# Patient Record
Sex: FEMALE | Race: WHITE | NOT HISPANIC OR LATINO | Employment: OTHER | URBAN - METROPOLITAN AREA
[De-identification: names, ages, dates, MRNs, and addresses within clinical notes are randomized per-mention and may not be internally consistent; named-entity substitution may affect disease eponyms.]

---

## 2023-05-04 ENCOUNTER — TELEPHONE (OUTPATIENT)
Dept: PRIMARY CARE | Facility: CLINIC | Age: 73
End: 2023-05-04
Payer: MEDICARE

## 2023-05-04 DIAGNOSIS — Z12.31 SCREENING MAMMOGRAM, ENCOUNTER FOR: Primary | ICD-10-CM

## 2023-05-08 LAB
ALANINE AMINOTRANSFERASE (SGPT) (U/L) IN SER/PLAS: 21 U/L (ref 7–45)
ALBUMIN (G/DL) IN SER/PLAS: 4.2 G/DL (ref 3.4–5)
ALKALINE PHOSPHATASE (U/L) IN SER/PLAS: 58 U/L (ref 33–136)
ANION GAP IN SER/PLAS: 10 MMOL/L (ref 10–20)
ASPARTATE AMINOTRANSFERASE (SGOT) (U/L) IN SER/PLAS: 28 U/L (ref 9–39)
BILIRUBIN TOTAL (MG/DL) IN SER/PLAS: 0.6 MG/DL (ref 0–1.2)
CALCIUM (MG/DL) IN SER/PLAS: 9.4 MG/DL (ref 8.6–10.3)
CARBON DIOXIDE, TOTAL (MMOL/L) IN SER/PLAS: 27 MMOL/L (ref 21–32)
CHLORIDE (MMOL/L) IN SER/PLAS: 104 MMOL/L (ref 98–107)
CHOLESTEROL (MG/DL) IN SER/PLAS: 199 MG/DL (ref 0–199)
CHOLESTEROL IN HDL (MG/DL) IN SER/PLAS: 71.4 MG/DL
CHOLESTEROL/HDL RATIO: 2.8
CREATININE (MG/DL) IN SER/PLAS: 0.87 MG/DL (ref 0.5–1.05)
GFR FEMALE: 70 ML/MIN/1.73M2
GLUCOSE (MG/DL) IN SER/PLAS: 83 MG/DL (ref 74–99)
LDL: 105 MG/DL (ref 0–99)
POTASSIUM (MMOL/L) IN SER/PLAS: 4.9 MMOL/L (ref 3.5–5.3)
PROTEIN TOTAL: 7.3 G/DL (ref 6.4–8.2)
SODIUM (MMOL/L) IN SER/PLAS: 136 MMOL/L (ref 136–145)
TRIGLYCERIDE (MG/DL) IN SER/PLAS: 113 MG/DL (ref 0–149)
UREA NITROGEN (MG/DL) IN SER/PLAS: 13 MG/DL (ref 6–23)
VLDL: 23 MG/DL (ref 0–40)

## 2023-05-12 PROBLEM — H35.00 RETINOPATHY: Status: ACTIVE | Noted: 2023-05-12

## 2023-05-12 PROBLEM — C44.91 BASAL CELL CARCINOMA: Status: ACTIVE | Noted: 2023-05-12

## 2023-05-12 PROBLEM — M19.90 ARTHRITIS: Status: ACTIVE | Noted: 2023-05-12

## 2023-05-15 ENCOUNTER — OFFICE VISIT (OUTPATIENT)
Dept: PRIMARY CARE | Facility: CLINIC | Age: 73
End: 2023-05-15
Payer: MEDICARE

## 2023-05-15 VITALS
OXYGEN SATURATION: 97 % | BODY MASS INDEX: 28 KG/M2 | HEART RATE: 68 BPM | DIASTOLIC BLOOD PRESSURE: 82 MMHG | HEIGHT: 63 IN | WEIGHT: 158 LBS | SYSTOLIC BLOOD PRESSURE: 130 MMHG | TEMPERATURE: 98.6 F

## 2023-05-15 DIAGNOSIS — K21.9 GASTROESOPHAGEAL REFLUX DISEASE WITHOUT ESOPHAGITIS: ICD-10-CM

## 2023-05-15 DIAGNOSIS — I10 ESSENTIAL HYPERTENSION: Primary | ICD-10-CM

## 2023-05-15 DIAGNOSIS — N18.2 CKD (CHRONIC KIDNEY DISEASE) STAGE 2, GFR 60-89 ML/MIN: ICD-10-CM

## 2023-05-15 DIAGNOSIS — E78.5 HYPERLIPIDEMIA, UNSPECIFIED HYPERLIPIDEMIA TYPE: ICD-10-CM

## 2023-05-15 PROCEDURE — 1036F TOBACCO NON-USER: CPT | Performed by: FAMILY MEDICINE

## 2023-05-15 PROCEDURE — 99214 OFFICE O/P EST MOD 30 MIN: CPT | Performed by: FAMILY MEDICINE

## 2023-05-15 PROCEDURE — 1160F RVW MEDS BY RX/DR IN RCRD: CPT | Performed by: FAMILY MEDICINE

## 2023-05-15 PROCEDURE — 1159F MED LIST DOCD IN RCRD: CPT | Performed by: FAMILY MEDICINE

## 2023-05-15 PROCEDURE — 3075F SYST BP GE 130 - 139MM HG: CPT | Performed by: FAMILY MEDICINE

## 2023-05-15 PROCEDURE — 3079F DIAST BP 80-89 MM HG: CPT | Performed by: FAMILY MEDICINE

## 2023-05-15 RX ORDER — AMOXICILLIN 500 MG
CAPSULE ORAL
COMMUNITY

## 2023-05-15 RX ORDER — SIMVASTATIN 20 MG/1
20 TABLET, FILM COATED ORAL DAILY
COMMUNITY
End: 2023-05-15 | Stop reason: DRUGHIGH

## 2023-05-15 RX ORDER — MAGNESIUM GLUCONATE 27.5 (500)
TABLET ORAL
COMMUNITY

## 2023-05-15 RX ORDER — OMEPRAZOLE 10 MG/1
10 CAPSULE, DELAYED RELEASE ORAL DAILY
COMMUNITY
End: 2023-06-27

## 2023-05-15 RX ORDER — LOSARTAN POTASSIUM 100 MG/1
100 TABLET ORAL DAILY
COMMUNITY
End: 2023-05-15 | Stop reason: SDUPTHER

## 2023-05-15 RX ORDER — ACETAMINOPHEN 500 MG
2 TABLET ORAL DAILY
COMMUNITY

## 2023-05-15 RX ORDER — SIMVASTATIN 40 MG/1
40 TABLET, FILM COATED ORAL NIGHTLY
Qty: 90 TABLET | Refills: 0 | Status: SHIPPED | OUTPATIENT
Start: 2023-05-15 | End: 2023-08-24 | Stop reason: SDUPTHER

## 2023-05-15 RX ORDER — NAPROXEN SODIUM 220 MG/1
81 TABLET, FILM COATED ORAL DAILY
COMMUNITY

## 2023-05-15 RX ORDER — LOSARTAN POTASSIUM 100 MG/1
100 TABLET ORAL DAILY
Qty: 90 TABLET | Refills: 0 | Status: SHIPPED | OUTPATIENT
Start: 2023-05-15 | End: 2023-08-24 | Stop reason: SDUPTHER

## 2023-05-15 RX ORDER — B-COMPLEX WITH VITAMIN C
1 TABLET ORAL DAILY
COMMUNITY

## 2023-05-15 ASSESSMENT — ENCOUNTER SYMPTOMS
SHORTNESS OF BREATH: 0
DIFFICULTY URINATING: 0
HYPERTENSION: 1
POLYDIPSIA: 0
SLEEP DISTURBANCE: 0
ABDOMINAL DISTENTION: 0
PALPITATIONS: 0
VOMITING: 0
FATIGUE: 0
BLOOD IN STOOL: 0
DIZZINESS: 0
DIARRHEA: 0
DYSPHORIC MOOD: 0
ARTHRALGIAS: 0
POLYPHAGIA: 0
ABDOMINAL PAIN: 0
CONSTIPATION: 0
HEADACHES: 0
NAUSEA: 0
DYSURIA: 0
MYALGIAS: 0

## 2023-05-15 NOTE — PROGRESS NOTES
"Subjective   Patient ID: Bianka Huffman is a 72 y.o. female who presents for Hypertension (Recheck), Hyperlipidemia (Review BW), Chronic Kidney Disease, and Breast Cancer Screening (Results done last week) and multiple issues    Hypertension  Pertinent negatives include no chest pain, headaches, palpitations or shortness of breath.   Hyperlipidemia  Pertinent negatives include no chest pain, myalgias or shortness of breath.      HTN: controlled  GERD: stable on PPI  Lipids: fair control. HDL 71, (88),   CKD: stable. Cr nml, GFR 70    Review of Systems   Constitutional:  Negative for fatigue.   HENT: Negative.     Eyes:  Negative for visual disturbance.   Respiratory:  Negative for shortness of breath.    Cardiovascular:  Negative for chest pain and palpitations.   Gastrointestinal:  Negative for abdominal distention, abdominal pain, blood in stool, constipation, diarrhea, nausea and vomiting.   Endocrine: Negative for cold intolerance, heat intolerance, polydipsia, polyphagia and polyuria.   Genitourinary:  Negative for difficulty urinating and dysuria.   Musculoskeletal:  Negative for arthralgias and myalgias.   Skin:  Negative for rash.   Neurological:  Negative for dizziness and headaches.   Psychiatric/Behavioral:  Negative for dysphoric mood and sleep disturbance.        Objective   /82   Pulse 68   Temp 37 °C (98.6 °F) (Temporal)   Ht 1.607 m (5' 3.25\")   Wt 71.7 kg (158 lb)   SpO2 97%   BMI 27.77 kg/m²     Physical Exam  Vitals and nursing note reviewed.   Constitutional:       General: She is not in acute distress.     Appearance: Normal appearance. She is not toxic-appearing.   HENT:      Head: Normocephalic.   Eyes:      General: No scleral icterus.     Pupils: Pupils are equal, round, and reactive to light.   Neck:      Vascular: No carotid bruit.   Cardiovascular:      Rate and Rhythm: Normal rate and regular rhythm.      Pulses: Normal pulses.      Heart sounds: No murmur " heard.  Pulmonary:      Effort: Pulmonary effort is normal. No respiratory distress.      Breath sounds: Normal breath sounds.   Abdominal:      General: Bowel sounds are normal.      Palpations: Abdomen is soft.      Tenderness: There is no abdominal tenderness. There is no guarding.   Musculoskeletal:         General: No tenderness.   Lymphadenopathy:      Cervical: No cervical adenopathy.   Skin:     General: Skin is warm.   Neurological:      General: No focal deficit present.      Mental Status: She is alert.      Cranial Nerves: No cranial nerve deficit.   Psychiatric:         Mood and Affect: Mood normal.         Assessment/Plan   Problem List Items Addressed This Visit          Circulatory    Essential hypertension - Primary    Relevant Medications    losartan (Cozaar) 100 mg tablet    Other Relevant Orders    Comprehensive Metabolic Panel       Digestive    Gastroesophageal reflux disease       Other    Hyperlipidemia    Relevant Medications    simvastatin (Zocor) 40 mg tablet    Other Relevant Orders    Comprehensive Metabolic Panel    Lipid Panel     Other Visit Diagnoses       CKD (chronic kidney disease) stage 2, GFR 60-89 ml/min        Relevant Orders    Comprehensive Metabolic Panel        Discussed blood work

## 2023-05-15 NOTE — PATIENT INSTRUCTIONS
Recommend a predominant whole foods plant based diet.  Cut back on meat, dairy, salt and oils. Increase fiber in your diet.  Decrease alcohol as much as possible if you drink. Recommend regular exercise most days of the week.    Increase your cholesterol med to  40 mg    Continue your other medications    Follow up in 3 months for recheck and medicare visit, sooner if needed

## 2023-05-16 ENCOUNTER — TELEPHONE (OUTPATIENT)
Dept: PRIMARY CARE | Facility: CLINIC | Age: 73
End: 2023-05-16
Payer: MEDICARE

## 2023-05-16 NOTE — TELEPHONE ENCOUNTER
Pt called and stated that during her appointment on 5/16 she mentioned to Iel that she swallowed something and IEL informed her to call poison control. Pt called them and they stated that she just needs to watch her stool and only to be concerned if it is bloody. Otherwise she probably passed it. Poison control stated it was probably aluminum.

## 2023-06-22 ENCOUNTER — APPOINTMENT (OUTPATIENT)
Dept: PRIMARY CARE | Facility: CLINIC | Age: 73
End: 2023-06-22
Payer: MEDICARE

## 2023-06-22 DIAGNOSIS — K21.9 GASTROESOPHAGEAL REFLUX DISEASE WITHOUT ESOPHAGITIS: Primary | ICD-10-CM

## 2023-06-27 RX ORDER — OMEPRAZOLE 10 MG/1
CAPSULE, DELAYED RELEASE ORAL
Qty: 90 CAPSULE | Refills: 0 | Status: SHIPPED | OUTPATIENT
Start: 2023-06-27 | End: 2023-08-24 | Stop reason: SDUPTHER

## 2023-08-17 ENCOUNTER — LAB (OUTPATIENT)
Dept: LAB | Facility: LAB | Age: 73
End: 2023-08-17
Payer: MEDICARE

## 2023-08-17 DIAGNOSIS — I10 ESSENTIAL HYPERTENSION: ICD-10-CM

## 2023-08-17 DIAGNOSIS — N18.2 CKD (CHRONIC KIDNEY DISEASE) STAGE 2, GFR 60-89 ML/MIN: ICD-10-CM

## 2023-08-17 DIAGNOSIS — E78.5 HYPERLIPIDEMIA, UNSPECIFIED HYPERLIPIDEMIA TYPE: ICD-10-CM

## 2023-08-17 LAB
ALANINE AMINOTRANSFERASE (SGPT) (U/L) IN SER/PLAS: 18 U/L (ref 7–45)
ALBUMIN (G/DL) IN SER/PLAS: 4.1 G/DL (ref 3.4–5)
ALKALINE PHOSPHATASE (U/L) IN SER/PLAS: 55 U/L (ref 33–136)
ANION GAP IN SER/PLAS: 10 MMOL/L (ref 10–20)
ASPARTATE AMINOTRANSFERASE (SGOT) (U/L) IN SER/PLAS: 18 U/L (ref 9–39)
BILIRUBIN TOTAL (MG/DL) IN SER/PLAS: 0.6 MG/DL (ref 0–1.2)
CALCIUM (MG/DL) IN SER/PLAS: 10.1 MG/DL (ref 8.6–10.3)
CARBON DIOXIDE, TOTAL (MMOL/L) IN SER/PLAS: 29 MMOL/L (ref 21–32)
CHLORIDE (MMOL/L) IN SER/PLAS: 103 MMOL/L (ref 98–107)
CHOLESTEROL (MG/DL) IN SER/PLAS: 154 MG/DL (ref 0–199)
CHOLESTEROL IN HDL (MG/DL) IN SER/PLAS: 67.2 MG/DL
CHOLESTEROL/HDL RATIO: 2.3
CREATININE (MG/DL) IN SER/PLAS: 0.73 MG/DL (ref 0.5–1.05)
GFR FEMALE: 86 ML/MIN/1.73M2
GLUCOSE (MG/DL) IN SER/PLAS: 82 MG/DL (ref 74–99)
LDL: 63 MG/DL (ref 0–99)
POTASSIUM (MMOL/L) IN SER/PLAS: 4.6 MMOL/L (ref 3.5–5.3)
PROTEIN TOTAL: 6.3 G/DL (ref 6.4–8.2)
SODIUM (MMOL/L) IN SER/PLAS: 137 MMOL/L (ref 136–145)
TRIGLYCERIDE (MG/DL) IN SER/PLAS: 120 MG/DL (ref 0–149)
UREA NITROGEN (MG/DL) IN SER/PLAS: 15 MG/DL (ref 6–23)
VLDL: 24 MG/DL (ref 0–40)

## 2023-08-17 PROCEDURE — 80061 LIPID PANEL: CPT

## 2023-08-17 PROCEDURE — 36415 COLL VENOUS BLD VENIPUNCTURE: CPT

## 2023-08-17 PROCEDURE — 80053 COMPREHEN METABOLIC PANEL: CPT

## 2023-08-24 ENCOUNTER — OFFICE VISIT (OUTPATIENT)
Dept: PRIMARY CARE | Facility: CLINIC | Age: 73
End: 2023-08-24
Payer: MEDICARE

## 2023-08-24 VITALS
TEMPERATURE: 97.7 F | OXYGEN SATURATION: 97 % | HEART RATE: 85 BPM | BODY MASS INDEX: 28.12 KG/M2 | SYSTOLIC BLOOD PRESSURE: 116 MMHG | WEIGHT: 160 LBS | DIASTOLIC BLOOD PRESSURE: 78 MMHG

## 2023-08-24 DIAGNOSIS — J32.9 SINUSITIS, UNSPECIFIED CHRONICITY, UNSPECIFIED LOCATION: ICD-10-CM

## 2023-08-24 DIAGNOSIS — Z23 NEED FOR INFLUENZA VACCINATION: ICD-10-CM

## 2023-08-24 DIAGNOSIS — J02.9 SORE THROAT: Primary | ICD-10-CM

## 2023-08-24 DIAGNOSIS — M25.551 PAIN OF RIGHT HIP: ICD-10-CM

## 2023-08-24 DIAGNOSIS — E78.5 HYPERLIPIDEMIA, UNSPECIFIED HYPERLIPIDEMIA TYPE: ICD-10-CM

## 2023-08-24 DIAGNOSIS — N18.2 CKD (CHRONIC KIDNEY DISEASE) STAGE 2, GFR 60-89 ML/MIN: ICD-10-CM

## 2023-08-24 DIAGNOSIS — K21.9 GASTROESOPHAGEAL REFLUX DISEASE WITHOUT ESOPHAGITIS: ICD-10-CM

## 2023-08-24 DIAGNOSIS — I10 ESSENTIAL HYPERTENSION: ICD-10-CM

## 2023-08-24 LAB — POC RAPID STREP: NEGATIVE

## 2023-08-24 PROCEDURE — 1036F TOBACCO NON-USER: CPT | Performed by: FAMILY MEDICINE

## 2023-08-24 PROCEDURE — 87635 SARS-COV-2 COVID-19 AMP PRB: CPT

## 2023-08-24 PROCEDURE — 1159F MED LIST DOCD IN RCRD: CPT | Performed by: FAMILY MEDICINE

## 2023-08-24 PROCEDURE — 90662 IIV NO PRSV INCREASED AG IM: CPT | Performed by: FAMILY MEDICINE

## 2023-08-24 PROCEDURE — 99214 OFFICE O/P EST MOD 30 MIN: CPT | Performed by: FAMILY MEDICINE

## 2023-08-24 PROCEDURE — G0008 ADMIN INFLUENZA VIRUS VAC: HCPCS | Performed by: FAMILY MEDICINE

## 2023-08-24 PROCEDURE — 1160F RVW MEDS BY RX/DR IN RCRD: CPT | Performed by: FAMILY MEDICINE

## 2023-08-24 PROCEDURE — 3078F DIAST BP <80 MM HG: CPT | Performed by: FAMILY MEDICINE

## 2023-08-24 PROCEDURE — 3074F SYST BP LT 130 MM HG: CPT | Performed by: FAMILY MEDICINE

## 2023-08-24 PROCEDURE — 87880 STREP A ASSAY W/OPTIC: CPT | Performed by: FAMILY MEDICINE

## 2023-08-24 RX ORDER — LOSARTAN POTASSIUM 100 MG/1
100 TABLET ORAL DAILY
Qty: 90 TABLET | Refills: 0 | Status: CANCELLED | OUTPATIENT
Start: 2023-08-24 | End: 2023-11-22

## 2023-08-24 RX ORDER — OMEPRAZOLE 10 MG/1
10 CAPSULE, DELAYED RELEASE ORAL DAILY
Qty: 90 CAPSULE | Refills: 0 | Status: CANCELLED | OUTPATIENT
Start: 2023-08-24

## 2023-08-24 RX ORDER — AMOXICILLIN AND CLAVULANATE POTASSIUM 875; 125 MG/1; MG/1
875 TABLET, FILM COATED ORAL 2 TIMES DAILY
Qty: 20 TABLET | Refills: 0 | Status: SHIPPED | OUTPATIENT
Start: 2023-08-24 | End: 2023-09-03

## 2023-08-24 RX ORDER — SIMVASTATIN 40 MG/1
40 TABLET, FILM COATED ORAL NIGHTLY
Qty: 90 TABLET | Refills: 0 | Status: CANCELLED | OUTPATIENT
Start: 2023-08-24 | End: 2023-11-22

## 2023-08-24 RX ORDER — LOSARTAN POTASSIUM 100 MG/1
100 TABLET ORAL DAILY
Qty: 180 TABLET | Refills: 0 | Status: SHIPPED | OUTPATIENT
Start: 2023-08-24 | End: 2024-05-15 | Stop reason: SDUPTHER

## 2023-08-24 RX ORDER — SIMVASTATIN 40 MG/1
40 TABLET, FILM COATED ORAL NIGHTLY
Qty: 180 TABLET | Refills: 0 | Status: SHIPPED | OUTPATIENT
Start: 2023-08-24 | End: 2023-08-29

## 2023-08-24 RX ORDER — OMEPRAZOLE 10 MG/1
10 CAPSULE, DELAYED RELEASE ORAL DAILY
Qty: 180 CAPSULE | Refills: 0 | Status: SHIPPED | OUTPATIENT
Start: 2023-08-24 | End: 2023-12-11 | Stop reason: SDUPTHER

## 2023-08-24 RX ORDER — GLUCOSAM/CHONDRO/HERB 149/HYAL 750-100 MG
1000 TABLET ORAL DAILY
COMMUNITY

## 2023-08-24 ASSESSMENT — ENCOUNTER SYMPTOMS
VOMITING: 0
BLOOD IN STOOL: 0
HYPERTENSION: 1
DIFFICULTY URINATING: 0
CONSTIPATION: 0
MYALGIAS: 0
ARTHRALGIAS: 0
POLYDIPSIA: 0
SLEEP DISTURBANCE: 0
PALPITATIONS: 0
SORE THROAT: 1
DYSURIA: 0
SHORTNESS OF BREATH: 0
NAUSEA: 0
FATIGUE: 1
DIZZINESS: 0
ABDOMINAL PAIN: 0
SINUS COMPLAINT: 1
DYSPHORIC MOOD: 0
HEADACHES: 0
DIARRHEA: 0
POLYPHAGIA: 0

## 2023-08-24 NOTE — PATIENT INSTRUCTIONS
Recommend a predominant whole foods plant based diet.  Cut back on meat, dairy, salt and oils. Increase fiber in your diet.  Decrease alcohol as much as possible if you drink. Recommend regular exercise most days of the week.    Start antibiotic.     Continue your current meds but ok to decrease cholesterol med.    Follow up in 3 months for recheck and medicare visit, sooner if needed

## 2023-08-24 NOTE — PROGRESS NOTES
Subjective   Patient ID: Bianka Huffman is a 72 y.o. female who presents for GERD, Hypertension, Hyperlipidemia (Review BW), Sore Throat, and Sinus Problem (Drainage x 1 week).    GERD  She complains of a sore throat. She reports no abdominal pain, no chest pain or no nausea. Associated symptoms include fatigue.   Hypertension  Pertinent negatives include no chest pain, headaches, palpitations or shortness of breath.   Hyperlipidemia  Pertinent negatives include no chest pain, myalgias or shortness of breath.   Sore Throat   Pertinent negatives include no abdominal pain, diarrhea, headaches, shortness of breath or vomiting.   Sinus Problem  Associated symptoms include a sore throat. Pertinent negatives include no headaches or shortness of breath.      Sinus: onset x 1 wk. + drainage. Started w/ laryngitis 2 wks ago. Felt better but now +drainage and sore throat. +fatigue.     CKD: stable. Cr nml, GFR 86    Lipids: HDL 67, LDL sig improvement 63(105), . Want to dec statin since also changed diet.     HTN: controlled.     GERD: controlled.     Rt hip pain: onset for several wk. No injury. Wakes up with throbbing pain rt inguinal area. No radiation of pain. No LBP    Review of Systems   Constitutional:  Positive for fatigue.   HENT:  Positive for postnasal drip and sore throat.    Eyes:  Negative for visual disturbance.   Respiratory:  Negative for shortness of breath.    Cardiovascular:  Negative for chest pain and palpitations.   Gastrointestinal:  Negative for abdominal pain, blood in stool, constipation, diarrhea, nausea and vomiting.   Endocrine: Negative for cold intolerance, heat intolerance, polydipsia, polyphagia and polyuria.   Genitourinary:  Negative for difficulty urinating and dysuria.   Musculoskeletal:  Negative for arthralgias and myalgias.   Skin:  Negative for rash.   Neurological:  Negative for dizziness and headaches.   Psychiatric/Behavioral:  Negative for dysphoric mood and sleep  disturbance.        Objective   /78   Pulse 85   Temp 36.5 °C (97.7 °F)   Wt 72.6 kg (160 lb)   SpO2 97%   BMI 28.12 kg/m²     Physical Exam  Constitutional:       General: She is not in acute distress.     Appearance: Normal appearance. She is not toxic-appearing.   HENT:      Head: Normocephalic.      Right Ear: Tympanic membrane normal.      Left Ear: Tympanic membrane normal.      Nose: Nose normal.      Mouth/Throat:      Pharynx: Oropharynx is clear.   Eyes:      General: No scleral icterus.     Pupils: Pupils are equal, round, and reactive to light.   Neck:      Vascular: No carotid bruit.   Cardiovascular:      Rate and Rhythm: Normal rate and regular rhythm.      Pulses: Normal pulses.      Heart sounds: No murmur heard.  Pulmonary:      Effort: Pulmonary effort is normal. No respiratory distress.      Breath sounds: Normal breath sounds.   Abdominal:      General: Bowel sounds are normal.      Palpations: Abdomen is soft.      Tenderness: There is no abdominal tenderness. There is no guarding.   Musculoskeletal:      Right lower leg: No edema.      Left lower leg: No edema.      Comments: Neg IRENE/FADIR. L spine NTTP. Gait nml   Skin:     General: Skin is warm.   Neurological:      General: No focal deficit present.      Mental Status: She is alert.      Cranial Nerves: No cranial nerve deficit.   Psychiatric:         Mood and Affect: Mood normal.         Assessment/Plan   Problem List Items Addressed This Visit       Essential hypertension    Relevant Medications    losartan (Cozaar) 100 mg tablet    Other Relevant Orders    Comprehensive Metabolic Panel    Gastroesophageal reflux disease    Relevant Medications    omeprazole (PriLOSEC) 10 mg DR capsule    Hyperlipidemia    Relevant Medications    simvastatin (Zocor) 40 mg tablet    Other Relevant Orders    Comprehensive Metabolic Panel    Lipid Panel     Other Visit Diagnoses       Sore throat    -  Primary    Relevant Orders    POCT Rapid  Strep A manually resulted (Completed)    Sinusitis, unspecified chronicity, unspecified location        Relevant Medications    amoxicillin-pot clavulanate (Augmentin) 875-125 mg tablet    Other Relevant Orders    Sars-CoV-2 PCR, Symptomatic    Pain of right hip        CKD (chronic kidney disease) stage 2, GFR 60-89 ml/min        Relevant Orders    Comprehensive Metabolic Panel    Need for influenza vaccination        Relevant Orders    Flu vaccine, quadrivalent, high-dose, preservative free, age 65y+ (Completed)        Discussed blood work. Referred for xray. Discussed side effect of med

## 2023-08-25 ENCOUNTER — TELEPHONE (OUTPATIENT)
Dept: PRIMARY CARE | Facility: CLINIC | Age: 73
End: 2023-08-25
Payer: MEDICARE

## 2023-08-25 LAB — SARS-COV-2 RESULT: NOT DETECTED

## 2023-08-28 ENCOUNTER — TELEPHONE (OUTPATIENT)
Dept: PRIMARY CARE | Facility: CLINIC | Age: 73
End: 2023-08-28
Payer: MEDICARE

## 2023-08-28 NOTE — TELEPHONE ENCOUNTER
Pt called rx line @ 2:22pm stating that her Simvastatin was lowered from 40mg to 20mg, but the 40mg was still sent in. Pt has FU to see how lower dose is, but lower dose not sent. Pt requesting 90 day for 20mg. VALARIE Chau    Next OV 11/27/23  Please advise./RUFUS

## 2023-08-29 DIAGNOSIS — E78.5 HYPERLIPIDEMIA, UNSPECIFIED HYPERLIPIDEMIA TYPE: Primary | ICD-10-CM

## 2023-08-29 RX ORDER — SIMVASTATIN 20 MG/1
20 TABLET, FILM COATED ORAL NIGHTLY
Qty: 90 TABLET | Refills: 0 | Status: SHIPPED | OUTPATIENT
Start: 2023-08-29 | End: 2024-05-15 | Stop reason: SDUPTHER

## 2023-11-24 DIAGNOSIS — E78.5 HYPERLIPIDEMIA, UNSPECIFIED HYPERLIPIDEMIA TYPE: ICD-10-CM

## 2023-11-27 ENCOUNTER — APPOINTMENT (OUTPATIENT)
Dept: PRIMARY CARE | Facility: CLINIC | Age: 73
End: 2023-11-27
Payer: MEDICARE

## 2023-11-27 RX ORDER — SIMVASTATIN 20 MG/1
20 TABLET, FILM COATED ORAL NIGHTLY
Qty: 90 TABLET | Refills: 0 | OUTPATIENT
Start: 2023-11-27

## 2023-12-05 ENCOUNTER — LAB (OUTPATIENT)
Dept: LAB | Facility: LAB | Age: 73
End: 2023-12-05
Payer: MEDICARE

## 2023-12-05 DIAGNOSIS — I10 ESSENTIAL HYPERTENSION: ICD-10-CM

## 2023-12-05 DIAGNOSIS — N18.2 CKD (CHRONIC KIDNEY DISEASE) STAGE 2, GFR 60-89 ML/MIN: ICD-10-CM

## 2023-12-05 DIAGNOSIS — E78.5 HYPERLIPIDEMIA, UNSPECIFIED HYPERLIPIDEMIA TYPE: ICD-10-CM

## 2023-12-05 LAB
ALBUMIN SERPL BCP-MCNC: 4.3 G/DL (ref 3.4–5)
ALP SERPL-CCNC: 61 U/L (ref 33–136)
ALT SERPL W P-5'-P-CCNC: 22 U/L (ref 7–45)
ANION GAP SERPL CALC-SCNC: 11 MMOL/L (ref 10–20)
AST SERPL W P-5'-P-CCNC: 19 U/L (ref 9–39)
BILIRUB SERPL-MCNC: 0.5 MG/DL (ref 0–1.2)
BUN SERPL-MCNC: 17 MG/DL (ref 6–23)
CALCIUM SERPL-MCNC: 10.5 MG/DL (ref 8.6–10.3)
CHLORIDE SERPL-SCNC: 101 MMOL/L (ref 98–107)
CHOLEST SERPL-MCNC: 197 MG/DL (ref 0–199)
CHOLESTEROL/HDL RATIO: 2.7
CO2 SERPL-SCNC: 28 MMOL/L (ref 21–32)
CREAT SERPL-MCNC: 0.89 MG/DL (ref 0.5–1.05)
GFR SERPL CREATININE-BSD FRML MDRD: 68 ML/MIN/1.73M*2
GLUCOSE SERPL-MCNC: 86 MG/DL (ref 74–99)
HDLC SERPL-MCNC: 73.1 MG/DL
LDLC SERPL CALC-MCNC: 103 MG/DL
NON HDL CHOLESTEROL: 124 MG/DL (ref 0–149)
POTASSIUM SERPL-SCNC: 5.1 MMOL/L (ref 3.5–5.3)
PROT SERPL-MCNC: 6.6 G/DL (ref 6.4–8.2)
SODIUM SERPL-SCNC: 135 MMOL/L (ref 136–145)
TRIGL SERPL-MCNC: 103 MG/DL (ref 0–149)
VLDL: 21 MG/DL (ref 0–40)

## 2023-12-05 PROCEDURE — 36415 COLL VENOUS BLD VENIPUNCTURE: CPT

## 2023-12-05 PROCEDURE — 80061 LIPID PANEL: CPT

## 2023-12-05 PROCEDURE — 80053 COMPREHEN METABOLIC PANEL: CPT

## 2023-12-11 ENCOUNTER — OFFICE VISIT (OUTPATIENT)
Dept: PRIMARY CARE | Facility: CLINIC | Age: 73
End: 2023-12-11
Payer: MEDICARE

## 2023-12-11 VITALS
OXYGEN SATURATION: 97 % | HEIGHT: 64 IN | SYSTOLIC BLOOD PRESSURE: 128 MMHG | WEIGHT: 163 LBS | TEMPERATURE: 97.1 F | DIASTOLIC BLOOD PRESSURE: 74 MMHG | BODY MASS INDEX: 27.83 KG/M2 | HEART RATE: 76 BPM

## 2023-12-11 DIAGNOSIS — I10 ESSENTIAL HYPERTENSION: ICD-10-CM

## 2023-12-11 DIAGNOSIS — E66.3 OVERWEIGHT (BMI 25.0-29.9): ICD-10-CM

## 2023-12-11 DIAGNOSIS — K21.9 GASTROESOPHAGEAL REFLUX DISEASE WITHOUT ESOPHAGITIS: ICD-10-CM

## 2023-12-11 DIAGNOSIS — E78.5 HYPERLIPIDEMIA, UNSPECIFIED HYPERLIPIDEMIA TYPE: ICD-10-CM

## 2023-12-11 DIAGNOSIS — Z00.00 ROUTINE GENERAL MEDICAL EXAMINATION AT HEALTH CARE FACILITY: Primary | ICD-10-CM

## 2023-12-11 PROCEDURE — 3074F SYST BP LT 130 MM HG: CPT | Performed by: FAMILY MEDICINE

## 2023-12-11 PROCEDURE — 1160F RVW MEDS BY RX/DR IN RCRD: CPT | Performed by: FAMILY MEDICINE

## 2023-12-11 PROCEDURE — G0446 INTENS BEHAVE THER CARDIO DX: HCPCS | Performed by: FAMILY MEDICINE

## 2023-12-11 PROCEDURE — 99214 OFFICE O/P EST MOD 30 MIN: CPT | Performed by: FAMILY MEDICINE

## 2023-12-11 PROCEDURE — 1170F FXNL STATUS ASSESSED: CPT | Performed by: FAMILY MEDICINE

## 2023-12-11 PROCEDURE — G0444 DEPRESSION SCREEN ANNUAL: HCPCS | Performed by: FAMILY MEDICINE

## 2023-12-11 PROCEDURE — 3078F DIAST BP <80 MM HG: CPT | Performed by: FAMILY MEDICINE

## 2023-12-11 PROCEDURE — 1159F MED LIST DOCD IN RCRD: CPT | Performed by: FAMILY MEDICINE

## 2023-12-11 PROCEDURE — 1036F TOBACCO NON-USER: CPT | Performed by: FAMILY MEDICINE

## 2023-12-11 PROCEDURE — G0439 PPPS, SUBSEQ VISIT: HCPCS | Performed by: FAMILY MEDICINE

## 2023-12-11 RX ORDER — OMEPRAZOLE 10 MG/1
10 CAPSULE, DELAYED RELEASE ORAL DAILY
Qty: 90 CAPSULE | Refills: 1 | Status: SHIPPED | OUTPATIENT
Start: 2023-12-11 | End: 2024-05-15 | Stop reason: SDUPTHER

## 2023-12-11 RX ORDER — SIMVASTATIN 20 MG/1
20 TABLET, FILM COATED ORAL NIGHTLY
Qty: 90 TABLET | Refills: 0 | Status: CANCELLED | OUTPATIENT
Start: 2023-12-11 | End: 2024-03-10

## 2023-12-11 ASSESSMENT — ACTIVITIES OF DAILY LIVING (ADL)
DOING_HOUSEWORK: INDEPENDENT
DRESSING: INDEPENDENT
MANAGING_FINANCES: INDEPENDENT
GROCERY_SHOPPING: INDEPENDENT
TAKING_MEDICATION: INDEPENDENT
BATHING: INDEPENDENT

## 2023-12-11 ASSESSMENT — ENCOUNTER SYMPTOMS
SHORTNESS OF BREATH: 0
HEADACHES: 0
PALPITATIONS: 0
DIZZINESS: 0
ABDOMINAL DISTENTION: 0
HYPERTENSION: 1
DYSPHORIC MOOD: 0
VOMITING: 0
ABDOMINAL PAIN: 0
DIARRHEA: 0
NAUSEA: 0
MYALGIAS: 0
DIFFICULTY URINATING: 0
POLYDIPSIA: 0
SLEEP DISTURBANCE: 0
DYSURIA: 0
POLYPHAGIA: 0
FATIGUE: 0
BLOOD IN STOOL: 0
CONSTIPATION: 0

## 2023-12-11 ASSESSMENT — PATIENT HEALTH QUESTIONNAIRE - PHQ9
SUM OF ALL RESPONSES TO PHQ9 QUESTIONS 1 AND 2: 0
1. LITTLE INTEREST OR PLEASURE IN DOING THINGS: NOT AT ALL
2. FEELING DOWN, DEPRESSED OR HOPELESS: NOT AT ALL

## 2023-12-11 NOTE — PROGRESS NOTES
"Subjective   Reason for Visit: Bianka Huffman is an 73 y.o. female here for a Medicare Wellness visit and multiple issues.     Past Medical, Surgical, and Family History reviewed and updated in chart.    Reviewed all medications by prescribing practitioner or clinical pharmacist (such as prescriptions, OTCs, herbal therapies and supplements) and documented in the medical record.    Hypertension  Pertinent negatives include no chest pain, headaches, palpitations or shortness of breath.   Hyperlipidemia  Pertinent negatives include no chest pain, myalgias or shortness of breath.       Patient Care Team:  Saba Daniels DO as PCP - General  Saba Daniels DO as PCP - [facility] Attributed Provider   Dr. Baker-dermatology    HTN: controlled.  Lipids: worsening on lower dose of statin. HDL 73, ,   GERD: controlled on low dose PPI  BMI: min high. Active. Trying to work on diet.     Pain: occ leg pain, hips. Xray showed mild OA rt hip.    Review of Systems   Constitutional:  Negative for fatigue.   HENT: Negative.     Eyes:  Negative for visual disturbance.   Respiratory:  Negative for shortness of breath.    Cardiovascular:  Negative for chest pain and palpitations.   Gastrointestinal:  Negative for abdominal distention, abdominal pain, blood in stool, constipation, diarrhea, nausea and vomiting.   Endocrine: Negative for cold intolerance, heat intolerance, polydipsia, polyphagia and polyuria.   Genitourinary:  Negative for difficulty urinating and dysuria.   Musculoskeletal:  Negative for myalgias.   Skin:  Negative for rash.   Neurological:  Negative for dizziness and headaches.   Psychiatric/Behavioral:  Negative for dysphoric mood and sleep disturbance.        Objective   Vitals:  /74   Pulse 76   Temp 36.2 °C (97.1 °F)   Ht 1.613 m (5' 3.5\")   Wt 73.9 kg (163 lb)   SpO2 97%   BMI 28.42 kg/m²       Physical Exam  Vitals and nursing note reviewed.   Constitutional:       General: She is " not in acute distress.     Appearance: Normal appearance. She is not toxic-appearing.   HENT:      Head: Normocephalic.   Eyes:      Pupils: Pupils are equal, round, and reactive to light.   Neck:      Vascular: No carotid bruit.   Cardiovascular:      Rate and Rhythm: Normal rate and regular rhythm.      Heart sounds: No murmur heard.  Pulmonary:      Effort: Pulmonary effort is normal. No respiratory distress.      Breath sounds: Normal breath sounds.   Abdominal:      General: Bowel sounds are normal.      Palpations: Abdomen is soft.      Tenderness: There is no abdominal tenderness. There is no guarding.   Musculoskeletal:      Cervical back: Neck supple.      Right lower leg: No edema.      Left lower leg: No edema.   Lymphadenopathy:      Cervical: No cervical adenopathy.   Skin:     General: Skin is warm.   Neurological:      General: No focal deficit present.      Mental Status: She is alert.      Cranial Nerves: No cranial nerve deficit.   Psychiatric:         Mood and Affect: Mood normal.         Assessment/Plan   Problem List Items Addressed This Visit       Essential hypertension    Overview     Note: ak         Gastroesophageal reflux disease    Relevant Medications    omeprazole (PriLOSEC) 10 mg DR capsule    Hyperlipidemia    Overview     Note: ak          Other Visit Diagnoses       Routine general medical examination at health care facility    -  Primary    Overweight (BMI 25.0-29.9)            Discussed blood work and wellness issues. Reviewed screenings and immunizations. Recommendations given    ASCVD risk counseling:  discussed ASCVD risk and score 18.9%.  Aspirin not indicated at this time. Lifestyle modifications including nutritional choices, exercise and trying to eliminate habits contributing to risk were discussed. Patient agreeable to make efforts to continue to control their current cardiovascular risk factors. On statin. Referred for CAC score

## 2023-12-11 NOTE — PATIENT INSTRUCTIONS
Recommend a predominant low fat whole foods plant based diet.  Cut back on meat, dairy, processed carbs, salt and oils. Increase fiber in your diet.  Decrease alcohol as much as possible if you drink. Recommend regular exercise most days of the week.    Increase cholesterol back to 40mg    Continue your current meds    Follow up with your specialists as scheduled    You were referred for calcium score test.     Return in 6 months, sooner if needed

## 2024-04-30 ENCOUNTER — TELEPHONE (OUTPATIENT)
Dept: PRIMARY CARE | Facility: CLINIC | Age: 74
End: 2024-04-30
Payer: MEDICARE

## 2024-04-30 DIAGNOSIS — E78.5 HYPERLIPIDEMIA, UNSPECIFIED HYPERLIPIDEMIA TYPE: ICD-10-CM

## 2024-04-30 DIAGNOSIS — Z12.31 ENCOUNTER FOR SCREENING MAMMOGRAM FOR MALIGNANT NEOPLASM OF BREAST: ICD-10-CM

## 2024-04-30 DIAGNOSIS — Z13.6 SCREENING FOR HEART DISEASE: Primary | ICD-10-CM

## 2024-04-30 NOTE — TELEPHONE ENCOUNTER
Pt called requesting lab order to be done prior to OV 5/15, she also is requesting a mammo and CA score order. I stated that can be discussed at next OV, she stated it was discussed at last OV, but no orders were put in. Please advise on ALL orders Thx      Next OV 5/15

## 2024-05-13 ENCOUNTER — HOSPITAL ENCOUNTER (OUTPATIENT)
Dept: RADIOLOGY | Facility: CLINIC | Age: 74
Discharge: HOME | End: 2024-05-13
Payer: MEDICARE

## 2024-05-13 ENCOUNTER — LAB (OUTPATIENT)
Dept: LAB | Facility: LAB | Age: 74
End: 2024-05-13
Payer: MEDICARE

## 2024-05-13 VITALS — BODY MASS INDEX: 28.7 KG/M2 | HEIGHT: 63 IN | WEIGHT: 162 LBS

## 2024-05-13 DIAGNOSIS — Z12.31 ENCOUNTER FOR SCREENING MAMMOGRAM FOR MALIGNANT NEOPLASM OF BREAST: ICD-10-CM

## 2024-05-13 DIAGNOSIS — E78.5 HYPERLIPIDEMIA, UNSPECIFIED HYPERLIPIDEMIA TYPE: ICD-10-CM

## 2024-05-13 LAB
ALBUMIN SERPL BCP-MCNC: 4.4 G/DL (ref 3.4–5)
ALP SERPL-CCNC: 63 U/L (ref 33–136)
ALT SERPL W P-5'-P-CCNC: 24 U/L (ref 7–45)
ANION GAP SERPL CALC-SCNC: 11 MMOL/L (ref 10–20)
AST SERPL W P-5'-P-CCNC: 22 U/L (ref 9–39)
BILIRUB SERPL-MCNC: 0.5 MG/DL (ref 0–1.2)
BUN SERPL-MCNC: 16 MG/DL (ref 6–23)
CALCIUM SERPL-MCNC: 9.8 MG/DL (ref 8.6–10.3)
CHLORIDE SERPL-SCNC: 103 MMOL/L (ref 98–107)
CHOLEST SERPL-MCNC: 176 MG/DL (ref 0–199)
CHOLESTEROL/HDL RATIO: 2.6
CO2 SERPL-SCNC: 26 MMOL/L (ref 21–32)
CREAT SERPL-MCNC: 0.83 MG/DL (ref 0.5–1.05)
EGFRCR SERPLBLD CKD-EPI 2021: 74 ML/MIN/1.73M*2
GLUCOSE SERPL-MCNC: 85 MG/DL (ref 74–99)
HDLC SERPL-MCNC: 68.4 MG/DL
LDLC SERPL CALC-MCNC: 82 MG/DL
NON HDL CHOLESTEROL: 108 MG/DL (ref 0–149)
POTASSIUM SERPL-SCNC: 4.2 MMOL/L (ref 3.5–5.3)
PROT SERPL-MCNC: 6.6 G/DL (ref 6.4–8.2)
SODIUM SERPL-SCNC: 136 MMOL/L (ref 136–145)
TRIGL SERPL-MCNC: 130 MG/DL (ref 0–149)
VLDL: 26 MG/DL (ref 0–40)

## 2024-05-13 PROCEDURE — 80053 COMPREHEN METABOLIC PANEL: CPT

## 2024-05-13 PROCEDURE — 80061 LIPID PANEL: CPT

## 2024-05-13 PROCEDURE — 77067 SCR MAMMO BI INCL CAD: CPT | Performed by: RADIOLOGY

## 2024-05-13 PROCEDURE — 77067 SCR MAMMO BI INCL CAD: CPT

## 2024-05-13 PROCEDURE — 36415 COLL VENOUS BLD VENIPUNCTURE: CPT

## 2024-05-13 PROCEDURE — 77063 BREAST TOMOSYNTHESIS BI: CPT | Performed by: RADIOLOGY

## 2024-05-15 ENCOUNTER — OFFICE VISIT (OUTPATIENT)
Dept: PRIMARY CARE | Facility: CLINIC | Age: 74
End: 2024-05-15
Payer: MEDICARE

## 2024-05-15 VITALS
BODY MASS INDEX: 29.05 KG/M2 | SYSTOLIC BLOOD PRESSURE: 136 MMHG | DIASTOLIC BLOOD PRESSURE: 80 MMHG | TEMPERATURE: 97.6 F | OXYGEN SATURATION: 96 % | HEART RATE: 73 BPM | WEIGHT: 164 LBS

## 2024-05-15 DIAGNOSIS — E78.5 HYPERLIPIDEMIA, UNSPECIFIED HYPERLIPIDEMIA TYPE: ICD-10-CM

## 2024-05-15 DIAGNOSIS — K21.9 GASTROESOPHAGEAL REFLUX DISEASE WITHOUT ESOPHAGITIS: ICD-10-CM

## 2024-05-15 DIAGNOSIS — R92.8 ABNORMAL MAMMOGRAM: Primary | ICD-10-CM

## 2024-05-15 DIAGNOSIS — I10 ESSENTIAL HYPERTENSION: ICD-10-CM

## 2024-05-15 PROCEDURE — 99214 OFFICE O/P EST MOD 30 MIN: CPT | Performed by: FAMILY MEDICINE

## 2024-05-15 PROCEDURE — 1159F MED LIST DOCD IN RCRD: CPT | Performed by: FAMILY MEDICINE

## 2024-05-15 PROCEDURE — 1036F TOBACCO NON-USER: CPT | Performed by: FAMILY MEDICINE

## 2024-05-15 PROCEDURE — 1160F RVW MEDS BY RX/DR IN RCRD: CPT | Performed by: FAMILY MEDICINE

## 2024-05-15 PROCEDURE — 3079F DIAST BP 80-89 MM HG: CPT | Performed by: FAMILY MEDICINE

## 2024-05-15 PROCEDURE — 3075F SYST BP GE 130 - 139MM HG: CPT | Performed by: FAMILY MEDICINE

## 2024-05-15 RX ORDER — LOSARTAN POTASSIUM 100 MG/1
100 TABLET ORAL DAILY
Qty: 90 TABLET | Refills: 1 | Status: SHIPPED | OUTPATIENT
Start: 2024-05-15 | End: 2024-11-11

## 2024-05-15 RX ORDER — OMEPRAZOLE 10 MG/1
10 CAPSULE, DELAYED RELEASE ORAL EVERY OTHER DAY
Qty: 45 CAPSULE | Refills: 1 | Status: SHIPPED | OUTPATIENT
Start: 2024-05-15 | End: 2024-11-11

## 2024-05-15 RX ORDER — SIMVASTATIN 40 MG/1
40 TABLET, FILM COATED ORAL NIGHTLY
Qty: 90 TABLET | Refills: 1 | Status: SHIPPED | OUTPATIENT
Start: 2024-05-15 | End: 2024-11-11

## 2024-05-15 ASSESSMENT — ENCOUNTER SYMPTOMS
ABDOMINAL PAIN: 0
HYPERTENSION: 1
POLYDIPSIA: 0
DIZZINESS: 0
ARTHRALGIAS: 0
SLEEP DISTURBANCE: 0
DIARRHEA: 0
MYALGIAS: 0
BLOOD IN STOOL: 0
CONSTIPATION: 0
NAUSEA: 0
DYSPHORIC MOOD: 0
SHORTNESS OF BREATH: 0
DYSURIA: 0
POLYPHAGIA: 0
FATIGUE: 0
PALPITATIONS: 0
VOMITING: 0
HEADACHES: 0
DIFFICULTY URINATING: 0

## 2024-05-15 NOTE — PROGRESS NOTES
Subjective   Patient ID: Bianka Huffman is a 73 y.o. female who presents for Hypertension and Hyperlipidemia (Review BW) and multiple issues.     Hypertension  Pertinent negatives include no chest pain, headaches, palpitations or shortness of breath.   Hyperlipidemia  Pertinent negatives include no chest pain, myalgias or shortness of breath.      Lipids: controlled. HDL 68, LDL 82(103),   Kidney: Cr bnml, GFR 74  HTN: controlled.     Review of Systems   Constitutional:  Negative for fatigue.   Eyes:  Negative for visual disturbance.   Respiratory:  Negative for shortness of breath.    Cardiovascular:  Negative for chest pain and palpitations.   Gastrointestinal:  Negative for abdominal pain, blood in stool, constipation, diarrhea, nausea and vomiting.   Endocrine: Negative for cold intolerance, heat intolerance, polydipsia, polyphagia and polyuria.   Genitourinary:  Negative for difficulty urinating and dysuria.   Musculoskeletal:  Negative for arthralgias and myalgias.   Skin:  Negative for rash.   Neurological:  Negative for dizziness and headaches.   Psychiatric/Behavioral:  Negative for dysphoric mood and sleep disturbance.        Objective   /80   Pulse 73   Temp 36.4 °C (97.6 °F)   Wt 74.4 kg (164 lb)   SpO2 96%   BMI 29.05 kg/m²     Physical Exam  Vitals and nursing note reviewed.   Constitutional:       General: She is not in acute distress.     Appearance: Normal appearance. She is not toxic-appearing.   HENT:      Head: Normocephalic.   Eyes:      General: No scleral icterus.     Pupils: Pupils are equal, round, and reactive to light.   Neck:      Vascular: No carotid bruit.   Cardiovascular:      Rate and Rhythm: Normal rate and regular rhythm.      Heart sounds: No murmur heard.  Pulmonary:      Effort: Pulmonary effort is normal. No respiratory distress.      Breath sounds: Normal breath sounds.   Abdominal:      Palpations: Abdomen is soft.      Tenderness: There is no abdominal  tenderness. There is no guarding.   Musculoskeletal:         General: No tenderness.      Cervical back: Neck supple.      Right lower leg: No edema.      Left lower leg: No edema.   Lymphadenopathy:      Cervical: No cervical adenopathy.   Skin:     General: Skin is warm.   Neurological:      General: No focal deficit present.      Mental Status: She is alert.      Cranial Nerves: No cranial nerve deficit.   Psychiatric:         Mood and Affect: Mood normal.         Assessment/Plan   Problem List Items Addressed This Visit             ICD-10-CM    Essential hypertension I10    Relevant Medications    losartan (Cozaar) 100 mg tablet    Other Relevant Orders    Comprehensive Metabolic Panel    Gastroesophageal reflux disease K21.9    Relevant Medications    omeprazole (PriLOSEC) 10 mg DR capsule    Hyperlipidemia E78.5    Relevant Medications    simvastatin (Zocor) 40 mg tablet     Discussed blood work.  Recommendations given

## 2024-05-15 NOTE — PATIENT INSTRUCTIONS
Recommend a predominant low fat whole foods plant based diet.  Cut back on meat, dairy, processed carbs, salt and oils(especially palm and coconut). Increase fiber in your diet.  Decrease alcohol as much as possible if you drink. Recommend regular exercise most days of the week(goal up to 150min per week). Also recommend good sleep habits aiming for 7-8 hours per night.     Continue your current meds    Please bring in copies of your advance directives to your next appointment     Return in 6 months for recheck and wellness visit, sooner if needed

## 2024-05-16 ENCOUNTER — TELEPHONE (OUTPATIENT)
Dept: PRIMARY CARE | Facility: CLINIC | Age: 74
End: 2024-05-16
Payer: MEDICARE

## 2024-05-16 NOTE — TELEPHONE ENCOUNTER
----- Message from Saba Daniels DO sent at 5/15/2024 10:48 PM EDT -----  Please tell patient radiology wants further images of the right breast.  Order placed

## 2024-05-23 ENCOUNTER — HOSPITAL ENCOUNTER (OUTPATIENT)
Dept: RADIOLOGY | Facility: HOSPITAL | Age: 74
Discharge: HOME | End: 2024-05-23
Payer: MEDICARE

## 2024-05-23 DIAGNOSIS — R92.8 ABNORMAL MAMMOGRAM: ICD-10-CM

## 2024-05-23 PROCEDURE — G0279 TOMOSYNTHESIS, MAMMO: HCPCS | Mod: RIGHT SIDE

## 2024-05-23 PROCEDURE — 77065 DX MAMMO INCL CAD UNI: CPT | Mod: RIGHT SIDE

## 2024-05-23 PROCEDURE — 77061 BREAST TOMOSYNTHESIS UNI: CPT | Mod: RT

## 2024-06-05 ENCOUNTER — TELEPHONE (OUTPATIENT)
Dept: PRIMARY CARE | Facility: CLINIC | Age: 74
End: 2024-06-05
Payer: MEDICARE

## 2024-06-05 NOTE — TELEPHONE ENCOUNTER
----- Message from Saba Daniels DO sent at 6/4/2024 10:48 PM EDT -----  Tell patient that her mammogram did not show any concerning findings per radiology.  They recommend routine screening moving forward

## 2024-06-11 ENCOUNTER — TELEPHONE (OUTPATIENT)
Dept: PRIMARY CARE | Facility: CLINIC | Age: 74
End: 2024-06-11
Payer: MEDICARE

## 2024-06-11 NOTE — TELEPHONE ENCOUNTER
----- Message from Rissa Moe sent at 6/11/2024 12:11 PM EDT -----  Regarding: move 11/18 appt to after 12/1 thx

## 2024-06-21 ENCOUNTER — TELEPHONE (OUTPATIENT)
Dept: PRIMARY CARE | Facility: CLINIC | Age: 74
End: 2024-06-21
Payer: MEDICARE

## 2024-06-21 NOTE — TELEPHONE ENCOUNTER
IEL pt     called stating pt has been vomiting, and diarrhea all day and he is not sure what she can take OTC, with the meds she is on... I told him she might need to go to urgent care and he insisted a msg to back to another provider.    Please advise Holgerx

## 2024-07-15 ENCOUNTER — HOSPITAL ENCOUNTER (OUTPATIENT)
Dept: RADIOLOGY | Facility: CLINIC | Age: 74
Discharge: HOME | End: 2024-07-15
Payer: MEDICARE

## 2024-07-15 DIAGNOSIS — Z13.6 SCREENING FOR HEART DISEASE: ICD-10-CM

## 2024-07-15 PROCEDURE — 75571 CT HRT W/O DYE W/CA TEST: CPT

## 2024-07-19 ENCOUNTER — TELEPHONE (OUTPATIENT)
Dept: PRIMARY CARE | Facility: CLINIC | Age: 74
End: 2024-07-19
Payer: MEDICARE

## 2024-07-19 NOTE — TELEPHONE ENCOUNTER
Called and spoke with pt- informed of provider appendage. Pt is asking what the difference between her simvastatin and the lipitor or crestor is? Pt is asking if the side effects are similar in the meds and what mg IEL recommends of the other medications. Pt is asking why these lipid meds are better than the one she is currently taking? Thanks, CG

## 2024-07-19 NOTE — TELEPHONE ENCOUNTER
----- Message from Saba Daniels sent at 7/19/2024 12:26 AM EDT -----  Pls tell pt that her Calcium score was high. If willing to change chol med, pls let me know. Would recommend lipitor or crestor

## 2024-07-23 ENCOUNTER — TELEPHONE (OUTPATIENT)
Dept: PRIMARY CARE | Facility: CLINIC | Age: 74
End: 2024-07-23

## 2024-07-23 NOTE — TELEPHONE ENCOUNTER
Spoke with patient. Pt will call back with answer. She wants to discuss with [employer] company first to see which will be covered

## 2024-07-24 NOTE — TELEPHONE ENCOUNTER
Pt states she thought her rx for atorvastatin was sent to her pharmacy but she called and it was not there. She is requesting it be sent asap for her to  tomorrow

## 2024-07-25 DIAGNOSIS — E78.5 HYPERLIPIDEMIA, UNSPECIFIED HYPERLIPIDEMIA TYPE: Primary | ICD-10-CM

## 2024-07-25 DIAGNOSIS — I25.10 CORONARY ARTERY DISEASE DUE TO CALCIFIED CORONARY LESION: ICD-10-CM

## 2024-07-25 DIAGNOSIS — I25.84 CORONARY ARTERY DISEASE DUE TO CALCIFIED CORONARY LESION: ICD-10-CM

## 2024-07-25 RX ORDER — ATORVASTATIN CALCIUM 40 MG/1
40 TABLET, FILM COATED ORAL DAILY
Qty: 90 TABLET | Refills: 0 | Status: SHIPPED | OUTPATIENT
Start: 2024-07-25 | End: 2024-10-23

## 2024-08-21 ENCOUNTER — TELEPHONE (OUTPATIENT)
Dept: PRIMARY CARE | Facility: CLINIC | Age: 74
End: 2024-08-21
Payer: MEDICARE

## 2024-08-21 NOTE — TELEPHONE ENCOUNTER
PT states she's been having dizzy spells since starting atorvastatin and wants to know if this could be causing the dizziness?

## 2024-08-26 ENCOUNTER — OFFICE VISIT (OUTPATIENT)
Dept: PRIMARY CARE | Facility: CLINIC | Age: 74
End: 2024-08-26
Payer: MEDICARE

## 2024-08-26 VITALS
DIASTOLIC BLOOD PRESSURE: 80 MMHG | BODY MASS INDEX: 28.7 KG/M2 | OXYGEN SATURATION: 97 % | TEMPERATURE: 97.1 F | HEART RATE: 92 BPM | WEIGHT: 162 LBS | SYSTOLIC BLOOD PRESSURE: 134 MMHG

## 2024-08-26 DIAGNOSIS — M54.50 LOW BACK PAIN WITHOUT SCIATICA, UNSPECIFIED BACK PAIN LATERALITY, UNSPECIFIED CHRONICITY: ICD-10-CM

## 2024-08-26 DIAGNOSIS — R82.90 URINE ABNORMALITY: Primary | ICD-10-CM

## 2024-08-26 DIAGNOSIS — R42 DIZZINESS: ICD-10-CM

## 2024-08-26 LAB
POC APPEARANCE, URINE: CLEAR
POC BILIRUBIN, URINE: NEGATIVE
POC BLOOD, URINE: NEGATIVE
POC COLOR, URINE: YELLOW
POC GLUCOSE, URINE: NEGATIVE MG/DL
POC KETONES, URINE: NEGATIVE MG/DL
POC LEUKOCYTES, URINE: NEGATIVE
POC NITRITE,URINE: NEGATIVE
POC PH, URINE: 6 PH
POC PROTEIN, URINE: NEGATIVE MG/DL
POC SPECIFIC GRAVITY, URINE: <=1.005
POC UROBILINOGEN, URINE: 0.2 EU/DL

## 2024-08-26 PROCEDURE — 1160F RVW MEDS BY RX/DR IN RCRD: CPT | Performed by: FAMILY MEDICINE

## 2024-08-26 PROCEDURE — 3075F SYST BP GE 130 - 139MM HG: CPT | Performed by: FAMILY MEDICINE

## 2024-08-26 PROCEDURE — 3079F DIAST BP 80-89 MM HG: CPT | Performed by: FAMILY MEDICINE

## 2024-08-26 PROCEDURE — 87086 URINE CULTURE/COLONY COUNT: CPT

## 2024-08-26 PROCEDURE — 81003 URINALYSIS AUTO W/O SCOPE: CPT | Performed by: FAMILY MEDICINE

## 2024-08-26 PROCEDURE — 1159F MED LIST DOCD IN RCRD: CPT | Performed by: FAMILY MEDICINE

## 2024-08-26 PROCEDURE — 1036F TOBACCO NON-USER: CPT | Performed by: FAMILY MEDICINE

## 2024-08-26 PROCEDURE — 99214 OFFICE O/P EST MOD 30 MIN: CPT | Performed by: FAMILY MEDICINE

## 2024-08-26 ASSESSMENT — ENCOUNTER SYMPTOMS
VOMITING: 0
SLEEP DISTURBANCE: 0
FREQUENCY: 1
SHORTNESS OF BREATH: 0
DIARRHEA: 0
BACK PAIN: 1
CONSTIPATION: 0
NAUSEA: 0
DIFFICULTY URINATING: 0
POLYDIPSIA: 0
POLYPHAGIA: 0
BLOOD IN STOOL: 0
DYSURIA: 0
FATIGUE: 0
ABDOMINAL PAIN: 0
PALPITATIONS: 0
HEADACHES: 0
DYSPHORIC MOOD: 0
MYALGIAS: 0

## 2024-08-26 NOTE — PATIENT INSTRUCTIONS
You were referred for xrays    Increase water intake    Try home vertigo exercises as discussed    Return as scheduled, sooner if needed

## 2024-08-26 NOTE — PROGRESS NOTES
Subjective   Patient ID: Bianka Huffman is a 73 y.o. female who presents for Vertigo (X 1 week), Back Pain (Low back pain), and Urinary Frequency (X 3 weeks) and multiple issues    Back Pain  Pertinent negatives include no abdominal pain, chest pain, dysuria or headaches.   Urinary Frequency   Associated symptoms include frequency. Pertinent negatives include no nausea or vomiting.      Vertigo: onset x 1 week. Worse when got up to go to bathroom middle of night. Has issues w/ motion sickness. Stopped atorvastatin. Thought it was related. Symptoms improving.     Urine frequency: onset x 3 weeks. Took 2 home testes was indicated she possibly had UTI.     LBP: recurrent. Worse when gets up from sitting or bending. Flaring left side.  No radiation.  No injury    Review of Systems   Constitutional:  Negative for fatigue.   HENT: Negative.     Eyes:  Negative for visual disturbance.   Respiratory:  Negative for shortness of breath.    Cardiovascular:  Negative for chest pain and palpitations.   Gastrointestinal:  Negative for abdominal pain, blood in stool, constipation, diarrhea, nausea and vomiting.   Endocrine: Negative for polydipsia, polyphagia and polyuria.   Genitourinary:  Positive for frequency. Negative for difficulty urinating and dysuria.   Musculoskeletal:  Positive for back pain. Negative for myalgias.   Skin:  Negative for rash.   Neurological:  Negative for headaches.   Psychiatric/Behavioral:  Negative for dysphoric mood and sleep disturbance.        Objective   /80   Pulse 92   Temp 36.2 °C (97.1 °F)   Wt 73.5 kg (162 lb)   SpO2 97%   BMI 28.70 kg/m²     Physical Exam  Vitals and nursing note reviewed.   Constitutional:       General: She is not in acute distress.     Appearance: Normal appearance. She is not toxic-appearing.   HENT:      Head: Normocephalic.   Eyes:      Pupils: Pupils are equal, round, and reactive to light.   Cardiovascular:      Rate and Rhythm: Normal rate and regular  rhythm.      Heart sounds: No murmur heard.  Pulmonary:      Effort: Pulmonary effort is normal. No respiratory distress.      Breath sounds: Normal breath sounds.   Abdominal:      General: Bowel sounds are normal.      Palpations: Abdomen is soft.      Tenderness: There is no abdominal tenderness. There is no right CVA tenderness, left CVA tenderness or guarding.   Musculoskeletal:      Cervical back: Neck supple.      Right lower leg: No edema.      Left lower leg: No edema.   Skin:     General: Skin is warm.   Neurological:      General: No focal deficit present.      Mental Status: She is alert.      Cranial Nerves: No cranial nerve deficit.   Psychiatric:         Mood and Affect: Mood normal.         Assessment/Plan   Problem List Items Addressed This Visit    None  Visit Diagnoses         Codes    Urine abnormality    -  Primary R82.90    Relevant Orders    Urine Culture    Dizziness     R42    Relevant Orders    POCT UA Automated manually resulted (Completed)    Low back pain without sciatica, unspecified back pain laterality, unspecified chronicity     M54.50    Relevant Orders    XR lumbar spine 2-3 views        Reviewed prior images.  Recommendations given

## 2024-08-28 LAB — BACTERIA UR CULT: NO GROWTH

## 2024-09-13 DIAGNOSIS — I25.10 CORONARY ARTERY DISEASE DUE TO CALCIFIED CORONARY LESION: ICD-10-CM

## 2024-09-13 DIAGNOSIS — E78.5 HYPERLIPIDEMIA, UNSPECIFIED HYPERLIPIDEMIA TYPE: ICD-10-CM

## 2024-09-13 DIAGNOSIS — I25.84 CORONARY ARTERY DISEASE DUE TO CALCIFIED CORONARY LESION: ICD-10-CM

## 2024-09-13 NOTE — TELEPHONE ENCOUNTER
Pt called stating that she will run out of her atorvastatin on 10/25 and next OV is 12/4.    Last OV was 8/30    Send to:    [id number] - [place], [place] - [address] [place] [address]

## 2024-09-16 RX ORDER — ATORVASTATIN CALCIUM 40 MG/1
40 TABLET, FILM COATED ORAL DAILY
Qty: 90 TABLET | Refills: 0 | Status: SHIPPED | OUTPATIENT
Start: 2024-09-16 | End: 2024-12-15

## 2024-09-16 NOTE — TELEPHONE ENCOUNTER
Pt called rx line @ 10:52am requesting RF on pended med. Per pt she called last week and has not heard anything. Pt now almost out. Ok for RF? Please advise. Thanks. JW

## 2024-10-10 ENCOUNTER — HOSPITAL ENCOUNTER (OUTPATIENT)
Dept: RADIOLOGY | Facility: CLINIC | Age: 74
Discharge: HOME | End: 2024-10-10
Payer: MEDICARE

## 2024-10-10 DIAGNOSIS — M54.50 LOW BACK PAIN WITHOUT SCIATICA, UNSPECIFIED BACK PAIN LATERALITY, UNSPECIFIED CHRONICITY: ICD-10-CM

## 2024-10-10 PROCEDURE — 72100 X-RAY EXAM L-S SPINE 2/3 VWS: CPT | Performed by: RADIOLOGY

## 2024-10-10 PROCEDURE — 72100 X-RAY EXAM L-S SPINE 2/3 VWS: CPT

## 2024-10-17 ENCOUNTER — TELEPHONE (OUTPATIENT)
Dept: PRIMARY CARE | Facility: CLINIC | Age: 74
End: 2024-10-17
Payer: MEDICARE

## 2024-10-17 NOTE — TELEPHONE ENCOUNTER
----- Message from Saba Daniels sent at 10/17/2024  1:47 PM EDT -----  Pls tell pt that she does have arthritis in her lower back and it getting worse when compared to prior xrays per radiology. Recommend PT

## 2024-11-18 ENCOUNTER — APPOINTMENT (OUTPATIENT)
Dept: PRIMARY CARE | Facility: CLINIC | Age: 74
End: 2024-11-18
Payer: MEDICARE

## 2024-11-25 ENCOUNTER — TELEPHONE (OUTPATIENT)
Dept: PRIMARY CARE | Facility: CLINIC | Age: 74
End: 2024-11-25
Payer: MEDICARE

## 2024-11-25 NOTE — TELEPHONE ENCOUNTER
Pt has an appt coming up on 12/04 and was asking if she had any blood work orders which she didn't.  Can we get those sent in?  Also pt wants to know if she should not take her meds when she gets her blood work done.  Please advise.

## 2024-11-26 ENCOUNTER — LAB (OUTPATIENT)
Dept: LAB | Facility: LAB | Age: 74
End: 2024-11-26
Payer: MEDICARE

## 2024-11-26 DIAGNOSIS — I10 ESSENTIAL HYPERTENSION: ICD-10-CM

## 2024-11-26 LAB
ALBUMIN SERPL BCP-MCNC: 4.3 G/DL (ref 3.4–5)
ALP SERPL-CCNC: 77 U/L (ref 33–136)
ALT SERPL W P-5'-P-CCNC: 24 U/L (ref 7–45)
ANION GAP SERPL CALC-SCNC: 13 MMOL/L (ref 10–20)
AST SERPL W P-5'-P-CCNC: 20 U/L (ref 9–39)
BILIRUB SERPL-MCNC: 0.8 MG/DL (ref 0–1.2)
BUN SERPL-MCNC: 13 MG/DL (ref 6–23)
CALCIUM SERPL-MCNC: 9.6 MG/DL (ref 8.6–10.3)
CHLORIDE SERPL-SCNC: 102 MMOL/L (ref 98–107)
CO2 SERPL-SCNC: 27 MMOL/L (ref 21–32)
CREAT SERPL-MCNC: 0.93 MG/DL (ref 0.5–1.05)
EGFRCR SERPLBLD CKD-EPI 2021: 65 ML/MIN/1.73M*2
GLUCOSE SERPL-MCNC: 82 MG/DL (ref 74–99)
POTASSIUM SERPL-SCNC: 4 MMOL/L (ref 3.5–5.3)
PROT SERPL-MCNC: 6.6 G/DL (ref 6.4–8.2)
SODIUM SERPL-SCNC: 138 MMOL/L (ref 136–145)

## 2024-11-26 PROCEDURE — 36415 COLL VENOUS BLD VENIPUNCTURE: CPT

## 2024-11-26 PROCEDURE — 80053 COMPREHEN METABOLIC PANEL: CPT

## 2024-12-04 ENCOUNTER — APPOINTMENT (OUTPATIENT)
Dept: PRIMARY CARE | Facility: CLINIC | Age: 74
End: 2024-12-04
Payer: MEDICARE

## 2024-12-04 VITALS
DIASTOLIC BLOOD PRESSURE: 88 MMHG | HEART RATE: 71 BPM | TEMPERATURE: 97.6 F | HEIGHT: 63 IN | OXYGEN SATURATION: 98 % | BODY MASS INDEX: 29.38 KG/M2 | SYSTOLIC BLOOD PRESSURE: 128 MMHG | WEIGHT: 165.8 LBS

## 2024-12-04 DIAGNOSIS — K21.9 GASTROESOPHAGEAL REFLUX DISEASE WITHOUT ESOPHAGITIS: ICD-10-CM

## 2024-12-04 DIAGNOSIS — Z78.9 FULL CODE STATUS: ICD-10-CM

## 2024-12-04 DIAGNOSIS — Z78.0 POSTMENOPAUSAL STATUS (AGE-RELATED) (NATURAL): ICD-10-CM

## 2024-12-04 DIAGNOSIS — I25.84 CORONARY ARTERY DISEASE DUE TO CALCIFIED CORONARY LESION: ICD-10-CM

## 2024-12-04 DIAGNOSIS — E78.5 HYPERLIPIDEMIA, UNSPECIFIED HYPERLIPIDEMIA TYPE: ICD-10-CM

## 2024-12-04 DIAGNOSIS — I25.10 CORONARY ARTERY DISEASE DUE TO CALCIFIED CORONARY LESION: ICD-10-CM

## 2024-12-04 DIAGNOSIS — I10 ESSENTIAL HYPERTENSION: ICD-10-CM

## 2024-12-04 DIAGNOSIS — N18.2 CKD (CHRONIC KIDNEY DISEASE) STAGE 2, GFR 60-89 ML/MIN: ICD-10-CM

## 2024-12-04 DIAGNOSIS — Z00.00 ROUTINE GENERAL MEDICAL EXAMINATION AT HEALTH CARE FACILITY: Primary | ICD-10-CM

## 2024-12-04 PROCEDURE — 99214 OFFICE O/P EST MOD 30 MIN: CPT | Performed by: FAMILY MEDICINE

## 2024-12-04 PROCEDURE — 1159F MED LIST DOCD IN RCRD: CPT | Performed by: FAMILY MEDICINE

## 2024-12-04 PROCEDURE — G0439 PPPS, SUBSEQ VISIT: HCPCS | Performed by: FAMILY MEDICINE

## 2024-12-04 PROCEDURE — 3079F DIAST BP 80-89 MM HG: CPT | Performed by: FAMILY MEDICINE

## 2024-12-04 PROCEDURE — 1036F TOBACCO NON-USER: CPT | Performed by: FAMILY MEDICINE

## 2024-12-04 PROCEDURE — 3074F SYST BP LT 130 MM HG: CPT | Performed by: FAMILY MEDICINE

## 2024-12-04 PROCEDURE — 1160F RVW MEDS BY RX/DR IN RCRD: CPT | Performed by: FAMILY MEDICINE

## 2024-12-04 PROCEDURE — 1158F ADVNC CARE PLAN TLK DOCD: CPT | Performed by: FAMILY MEDICINE

## 2024-12-04 PROCEDURE — 1123F ACP DISCUSS/DSCN MKR DOCD: CPT | Performed by: FAMILY MEDICINE

## 2024-12-04 PROCEDURE — 1170F FXNL STATUS ASSESSED: CPT | Performed by: FAMILY MEDICINE

## 2024-12-04 RX ORDER — ATORVASTATIN CALCIUM 40 MG/1
TABLET, FILM COATED ORAL
Qty: 100 TABLET | Refills: 0 | Status: SHIPPED | OUTPATIENT
Start: 2024-12-04

## 2024-12-04 RX ORDER — OMEPRAZOLE 10 MG/1
10 CAPSULE, DELAYED RELEASE ORAL EVERY OTHER DAY
Qty: 100 CAPSULE | Refills: 0 | Status: SHIPPED | OUTPATIENT
Start: 2024-12-04 | End: 2025-06-21

## 2024-12-04 RX ORDER — LOSARTAN POTASSIUM 100 MG/1
100 TABLET ORAL DAILY
Qty: 90 TABLET | Refills: 0 | Status: SHIPPED | OUTPATIENT
Start: 2024-12-04 | End: 2025-03-04

## 2024-12-04 RX ORDER — ATORVASTATIN CALCIUM 40 MG/1
40 TABLET, FILM COATED ORAL DAILY
Qty: 90 TABLET | Refills: 0 | Status: SHIPPED | OUTPATIENT
Start: 2024-12-04 | End: 2025-03-04

## 2024-12-04 RX ORDER — LOSARTAN POTASSIUM 100 MG/1
100 TABLET ORAL DAILY
Qty: 100 TABLET | Refills: 0 | Status: SHIPPED | OUTPATIENT
Start: 2024-12-04 | End: 2025-03-14

## 2024-12-04 RX ORDER — OMEPRAZOLE 10 MG/1
10 CAPSULE, DELAYED RELEASE ORAL EVERY OTHER DAY
Qty: 45 CAPSULE | Refills: 0 | Status: SHIPPED | OUTPATIENT
Start: 2024-12-04 | End: 2025-03-04

## 2024-12-04 ASSESSMENT — ENCOUNTER SYMPTOMS
DIARRHEA: 0
PALPITATIONS: 0
ARTHRALGIAS: 1
MYALGIAS: 0
BLOOD IN STOOL: 0
POLYPHAGIA: 0
SLEEP DISTURBANCE: 0
CONSTIPATION: 0
NAUSEA: 0
BREAST PAIN: 1
SHORTNESS OF BREATH: 0
FATIGUE: 0
DYSPHORIC MOOD: 0
HEADACHES: 0
ABDOMINAL PAIN: 0
DIFFICULTY URINATING: 0
VOMITING: 0
DIZZINESS: 0
DYSURIA: 0
POLYDIPSIA: 0

## 2024-12-04 ASSESSMENT — ACTIVITIES OF DAILY LIVING (ADL)
GROCERY_SHOPPING: INDEPENDENT
DRESSING: INDEPENDENT
BATHING: INDEPENDENT
MANAGING_FINANCES: INDEPENDENT
TAKING_MEDICATION: INDEPENDENT
DOING_HOUSEWORK: INDEPENDENT

## 2024-12-04 ASSESSMENT — PATIENT HEALTH QUESTIONNAIRE - PHQ9
1. LITTLE INTEREST OR PLEASURE IN DOING THINGS: NOT AT ALL
SUM OF ALL RESPONSES TO PHQ9 QUESTIONS 1 AND 2: 0
2. FEELING DOWN, DEPRESSED OR HOPELESS: NOT AT ALL

## 2024-12-04 NOTE — ASSESSMENT & PLAN NOTE
Orders:    losartan (Cozaar) 100 mg tablet; Take 1 tablet (100 mg) by mouth once daily.    losartan (Cozaar) 100 mg tablet; Take 1 tablet (100 mg) by mouth once daily.

## 2024-12-04 NOTE — ASSESSMENT & PLAN NOTE
Orders:    atorvastatin (Lipitor) 40 mg tablet; Take 1 tablet (40 mg) by mouth once daily.    atorvastatin (Lipitor) 40 mg tablet; 1 po daily    Comprehensive Metabolic Panel; Future    Lipid Panel; Future

## 2024-12-04 NOTE — ASSESSMENT & PLAN NOTE
Orders:    omeprazole (PriLOSEC) 10 mg DR capsule; Take 1 capsule (10 mg) by mouth every other day. Do not crush or chew.    omeprazole (PriLOSEC) 10 mg DR capsule; Take 1 capsule (10 mg) by mouth every other day for 100 doses. Do not crush or chew.

## 2024-12-04 NOTE — PATIENT INSTRUCTIONS
Recommend a predominant low fat whole foods plant based diet.  Cut back on meat, dairy, processed carbs, salt and oils(especially palm and coconut). Increase fiber in your diet.  Decrease alcohol as much as possible if you drink. Recommend regular exercise most days of the week(goal up to 150min per week). Also recommend good sleep habits aiming for 7-8 hours per night.     Follow up with your specialists as scheduled    Continue your current meds    You were referred for bone scan    Return in 6 months, sooner if needed

## 2024-12-04 NOTE — PROGRESS NOTES
"Subjective   Reason for Visit: Bianka Huffman is an 74 y.o. female here for a Medicare Wellness visit and multiple issues.     Past Medical, Surgical, and Family History reviewed and updated in chart.    Reviewed all medications by prescribing practitioner or clinical pharmacist (such as prescriptions, OTCs, herbal therapies and supplements) and documented in the medical record.    Hyperlipidemia  Pertinent negatives include no chest pain, myalgias or shortness of breath.   Breast Pain  Associated Symptoms: breast pain        Patient Care Team:  Saba Daniels DO as PCP - General  Saba Daniels DO as PCP - [facility] Attributed Provider   Dr. Baker-dermatology    HTN: controlled  CKD: stable. Crn ml, GFR 65  Lipids: have been stable  GERD: controlled on every other day PPI  BMI: min high    Pain: denies    Changing pharmacies so needs printed Rx  Review of Systems   Constitutional:  Negative for fatigue.   Eyes:  Negative for visual disturbance.   Respiratory:  Negative for shortness of breath.    Cardiovascular:  Negative for chest pain and palpitations.   Gastrointestinal:  Negative for abdominal pain, blood in stool, constipation, diarrhea, nausea and vomiting.   Endocrine: Negative for cold intolerance, heat intolerance, polydipsia, polyphagia and polyuria.   Genitourinary:  Negative for difficulty urinating and dysuria.   Musculoskeletal:  Positive for arthralgias (+pain near breast bone on right.). Negative for myalgias.   Skin:  Negative for rash.   Neurological:  Negative for dizziness and headaches.   Psychiatric/Behavioral:  Negative for dysphoric mood and sleep disturbance.        Objective   Vitals:  /88   Pulse 71   Temp 36.4 °C (97.6 °F)   Ht 1.6 m (5' 3\")   Wt 75.2 kg (165 lb 12.8 oz)   SpO2 98%   BMI 29.37 kg/m²       Physical Exam  Vitals and nursing note reviewed.   Constitutional:       General: She is not in acute distress.     Appearance: Normal appearance. She is not " toxic-appearing.   HENT:      Head: Normocephalic.   Eyes:      General: No scleral icterus.     Pupils: Pupils are equal, round, and reactive to light.   Neck:      Vascular: No carotid bruit.   Cardiovascular:      Rate and Rhythm: Normal rate and regular rhythm.      Heart sounds: No murmur heard.  Pulmonary:      Effort: Pulmonary effort is normal. No respiratory distress.      Breath sounds: Normal breath sounds.   Abdominal:      Palpations: Abdomen is soft.      Tenderness: There is no abdominal tenderness. There is no guarding.   Musculoskeletal:         General: Tenderness (lower rt breast bone. reproducible) present.      Cervical back: Neck supple.      Right lower leg: No edema.      Left lower leg: No edema.   Lymphadenopathy:      Cervical: No cervical adenopathy.   Skin:     General: Skin is warm.   Neurological:      General: No focal deficit present.      Mental Status: She is alert.      Cranial Nerves: No cranial nerve deficit.   Psychiatric:         Mood and Affect: Mood normal.         Assessment & Plan  Gastroesophageal reflux disease without esophagitis    Orders:    omeprazole (PriLOSEC) 10 mg DR capsule; Take 1 capsule (10 mg) by mouth every other day. Do not crush or chew.    omeprazole (PriLOSEC) 10 mg DR capsule; Take 1 capsule (10 mg) by mouth every other day for 100 doses. Do not crush or chew.    Essential hypertension    Orders:    losartan (Cozaar) 100 mg tablet; Take 1 tablet (100 mg) by mouth once daily.    losartan (Cozaar) 100 mg tablet; Take 1 tablet (100 mg) by mouth once daily.    Hyperlipidemia, unspecified hyperlipidemia type    Orders:    atorvastatin (Lipitor) 40 mg tablet; Take 1 tablet (40 mg) by mouth once daily.    atorvastatin (Lipitor) 40 mg tablet; 1 po daily    Comprehensive Metabolic Panel; Future    Lipid Panel; Future    Coronary artery disease due to calcified coronary lesion    Orders:    atorvastatin (Lipitor) 40 mg tablet; Take 1 tablet (40 mg) by mouth once  daily.    Comprehensive Metabolic Panel; Future    Lipid Panel; Future    Routine general medical examination at health care facility         Full code status    Orders:    Full code    CKD (chronic kidney disease) stage 2, GFR 60-89 ml/min    Orders:    Comprehensive Metabolic Panel; Future    Postmenopausal status (age-related) (natural)    Orders:    XR DEXA bone density; Future       Discussed blood work and wellness issues. Reviewed screenings and immunizations. Recommendations given    ASCVD risk counseling:  discussed ASCVD risk and score 15.4%.  Aspirin not indicated at this time. Lifestyle modifications including nutritional choices, exercise and trying to eliminate habits contributing to risk were discussed. Patient agreeable to make efforts to continue to control their current cardiovascular risk factors. On statin    Rec pt monitor chest symptoms. Likely MSK since reproducible

## 2025-04-04 ENCOUNTER — HOSPITAL ENCOUNTER (OUTPATIENT)
Dept: RADIOLOGY | Facility: CLINIC | Age: 75
Discharge: HOME | End: 2025-04-04
Payer: MEDICARE

## 2025-04-04 DIAGNOSIS — Z78.0 POSTMENOPAUSAL STATUS (AGE-RELATED) (NATURAL): ICD-10-CM

## 2025-04-04 PROCEDURE — 77080 DXA BONE DENSITY AXIAL: CPT

## 2025-04-11 ENCOUNTER — TELEPHONE (OUTPATIENT)
Dept: PRIMARY CARE | Facility: CLINIC | Age: 75
End: 2025-04-11
Payer: MEDICARE

## 2025-04-11 NOTE — TELEPHONE ENCOUNTER
----- Message from Saba Daniels sent at 4/11/2025  9:23 AM EDT -----  Please tell the patient that her DEXA bone scan showed thinning of her  bones but not osteoporosis.  Recommend increasing calcium in her diet, mostly from dark green vegetables.  Try to aim for 1200mg calcium in diet(can supplement the rest if unable to get from diet) with 800 U vit D. Recommend weight bearing exercises.

## 2025-04-15 ENCOUNTER — TELEPHONE (OUTPATIENT)
Dept: PRIMARY CARE | Facility: CLINIC | Age: 75
End: 2025-04-15
Payer: MEDICARE

## 2025-04-15 NOTE — TELEPHONE ENCOUNTER
Patient went to Urgent Care she was told she has Bronchitis and pink eye. She has concerns about the medicine she was given The Doxycycline Monohydrate 100 mg Caps 10 days worth lay flat for 1/2 hour do not take iron or Ant Acids and Salve for her eyes. She was also given Benzonatate 100 mg 3 time a day for 7 days. When she took this medicine she coughed a lot thought it was to stop the cough. Is asking if it is safe to take the other medicine has not taken it yet. Please Advise

## 2025-04-16 ENCOUNTER — OFFICE VISIT (OUTPATIENT)
Dept: PRIMARY CARE | Facility: CLINIC | Age: 75
End: 2025-04-16
Payer: MEDICARE

## 2025-04-16 VITALS
SYSTOLIC BLOOD PRESSURE: 132 MMHG | DIASTOLIC BLOOD PRESSURE: 90 MMHG | TEMPERATURE: 97.5 F | OXYGEN SATURATION: 97 % | HEART RATE: 80 BPM

## 2025-04-16 DIAGNOSIS — R05.2 SUBACUTE COUGH: ICD-10-CM

## 2025-04-16 DIAGNOSIS — J32.9 SINUSITIS, UNSPECIFIED CHRONICITY, UNSPECIFIED LOCATION: Primary | ICD-10-CM

## 2025-04-16 PROCEDURE — 1160F RVW MEDS BY RX/DR IN RCRD: CPT | Performed by: FAMILY MEDICINE

## 2025-04-16 PROCEDURE — 99214 OFFICE O/P EST MOD 30 MIN: CPT | Performed by: FAMILY MEDICINE

## 2025-04-16 PROCEDURE — 1157F ADVNC CARE PLAN IN RCRD: CPT | Performed by: FAMILY MEDICINE

## 2025-04-16 PROCEDURE — 1123F ACP DISCUSS/DSCN MKR DOCD: CPT | Performed by: FAMILY MEDICINE

## 2025-04-16 PROCEDURE — 3080F DIAST BP >= 90 MM HG: CPT | Performed by: FAMILY MEDICINE

## 2025-04-16 PROCEDURE — 1036F TOBACCO NON-USER: CPT | Performed by: FAMILY MEDICINE

## 2025-04-16 PROCEDURE — G2211 COMPLEX E/M VISIT ADD ON: HCPCS | Performed by: FAMILY MEDICINE

## 2025-04-16 PROCEDURE — 1159F MED LIST DOCD IN RCRD: CPT | Performed by: FAMILY MEDICINE

## 2025-04-16 PROCEDURE — 3075F SYST BP GE 130 - 139MM HG: CPT | Performed by: FAMILY MEDICINE

## 2025-04-16 RX ORDER — BENZONATATE 100 MG/1
CAPSULE ORAL
COMMUNITY
Start: 2025-04-14

## 2025-04-16 RX ORDER — OMEPRAZOLE 10 MG/1
10 CAPSULE, DELAYED RELEASE ORAL EVERY OTHER DAY
Qty: 45 CAPSULE | Refills: 0 | Status: CANCELLED | OUTPATIENT
Start: 2025-04-16 | End: 2025-07-15

## 2025-04-16 RX ORDER — BACITRACIN ZINC AND POLYMYXIN B SULFATE 500; 10000 [USP'U]/G; [USP'U]/G
OINTMENT OPHTHALMIC
COMMUNITY
Start: 2025-04-14

## 2025-04-16 RX ORDER — ATORVASTATIN CALCIUM 40 MG/1
40 TABLET, FILM COATED ORAL DAILY
Qty: 90 TABLET | Refills: 0 | Status: CANCELLED | OUTPATIENT
Start: 2025-04-16 | End: 2025-07-15

## 2025-04-16 RX ORDER — LOSARTAN POTASSIUM 100 MG/1
100 TABLET ORAL DAILY
Qty: 100 TABLET | Refills: 0 | Status: CANCELLED | OUTPATIENT
Start: 2025-04-16 | End: 2025-07-25

## 2025-04-16 RX ORDER — AMOXICILLIN AND CLAVULANATE POTASSIUM 875; 125 MG/1; MG/1
875 TABLET, FILM COATED ORAL 2 TIMES DAILY
Qty: 20 TABLET | Refills: 0 | Status: SHIPPED | OUTPATIENT
Start: 2025-04-16 | End: 2025-04-26

## 2025-04-16 ASSESSMENT — ENCOUNTER SYMPTOMS
PALPITATIONS: 0
FATIGUE: 1
DYSPHORIC MOOD: 0
NAUSEA: 0
ABDOMINAL PAIN: 0
HEADACHES: 0
DIZZINESS: 0
VOMITING: 0
MYALGIAS: 0
SINUS PRESSURE: 1
FEVER: 0
DIARRHEA: 0
SLEEP DISTURBANCE: 0
CHILLS: 1
COUGH: 1

## 2025-04-16 NOTE — PATIENT INSTRUCTIONS
Start antibiotic    Go to the ER if any of your symptoms are significantly worsening.     Return as scheduled, sooner if needed

## 2025-04-16 NOTE — PROGRESS NOTES
Subjective   Patient ID: Bianka Huffman is a 74 y.o. female who presents for Bronchitis (Went to urgent care 4/14, was told has bronchitis ) and Conjunctivitis.    Conjunctivitis   Associated symptoms include congestion and cough. Pertinent negatives include no fever, no abdominal pain, no diarrhea, no nausea, no vomiting, no headaches and no rash.      Cough: onset x >1 week. Last travel last month. No assoc F. +fatigue/sore throat. No ear pain. Sore throat improved. Cough productive. +purulent drainage from nose. +sick contacts. Some SOB w/ mod activity. No CP/N/V/D. Appetite down. Also w/ conjunctivitis left eye. Went to  in [place]. Given tessalon perles, doxy and bacitracin ointment. Did not start abx. Concerned about side effects. Improved w/ topical ointment.     Review of Systems   Constitutional:  Positive for chills and fatigue. Negative for fever.   HENT:  Positive for congestion and sinus pressure.    Eyes:  Negative for visual disturbance.   Respiratory:  Positive for cough.    Cardiovascular:  Negative for chest pain and palpitations.   Gastrointestinal:  Negative for abdominal pain, diarrhea, nausea and vomiting.   Musculoskeletal:  Negative for myalgias.   Skin:  Negative for rash.   Neurological:  Negative for dizziness and headaches.   Psychiatric/Behavioral:  Negative for dysphoric mood and sleep disturbance.        Objective   /90   Pulse 80   Temp 36.4 °C (97.5 °F)   SpO2 97%     Physical Exam  Vitals and nursing note reviewed.   Constitutional:       General: She is not in acute distress.     Appearance: Normal appearance. She is not toxic-appearing.   HENT:      Head: Normocephalic.      Right Ear: Tympanic membrane normal.      Left Ear: Tympanic membrane normal.      Nose: Congestion present. No rhinorrhea.      Mouth/Throat:      Pharynx: Oropharynx is clear. No oropharyngeal exudate or posterior oropharyngeal erythema.   Eyes:      Pupils: Pupils are equal, round, and  reactive to light.   Cardiovascular:      Rate and Rhythm: Normal rate and regular rhythm.      Heart sounds: No murmur heard.  Pulmonary:      Effort: Pulmonary effort is normal. No respiratory distress.      Breath sounds: Normal breath sounds.   Musculoskeletal:         General: No tenderness.      Cervical back: Neck supple.      Right lower leg: No edema.      Left lower leg: No edema.   Lymphadenopathy:      Cervical: No cervical adenopathy.   Skin:     General: Skin is warm.   Neurological:      General: No focal deficit present.      Mental Status: She is alert.      Cranial Nerves: No cranial nerve deficit.   Psychiatric:         Mood and Affect: Mood normal.         Assessment/Plan   Problem List Items Addressed This Visit           ICD-10-CM    Sinusitis - Primary J32.9    Start antibiotic. Increase fluids.          Relevant Medications    amoxicillin-clavulanate (Augmentin) 875-125 mg tablet    Subacute cough R05.2    Start antibiotic. Increase fluids.          Relevant Medications    amoxicillin-clavulanate (Augmentin) 875-125 mg tablet   Discussed side effect of meds. Recommendations given

## 2025-05-30 LAB
ALBUMIN SERPL-MCNC: 4.5 G/DL (ref 3.6–5.1)
ALP SERPL-CCNC: 79 U/L (ref 37–153)
ALT SERPL-CCNC: 22 U/L (ref 6–29)
ANION GAP SERPL CALCULATED.4IONS-SCNC: 8 MMOL/L (CALC) (ref 7–17)
AST SERPL-CCNC: 21 U/L (ref 10–35)
BILIRUB SERPL-MCNC: 0.7 MG/DL (ref 0.2–1.2)
BUN SERPL-MCNC: 15 MG/DL (ref 7–25)
CALCIUM SERPL-MCNC: 10.1 MG/DL (ref 8.6–10.4)
CHLORIDE SERPL-SCNC: 105 MMOL/L (ref 98–110)
CHOLEST SERPL-MCNC: 191 MG/DL
CHOLEST/HDLC SERPL: 2.6 (CALC)
CO2 SERPL-SCNC: 26 MMOL/L (ref 20–32)
CREAT SERPL-MCNC: 0.88 MG/DL (ref 0.6–1)
EGFRCR SERPLBLD CKD-EPI 2021: 68 ML/MIN/1.73M2
GLUCOSE SERPL-MCNC: 92 MG/DL (ref 65–99)
HDLC SERPL-MCNC: 74 MG/DL
LDLC SERPL CALC-MCNC: 94 MG/DL (CALC)
NONHDLC SERPL-MCNC: 117 MG/DL (CALC)
POTASSIUM SERPL-SCNC: 3.9 MMOL/L (ref 3.5–5.3)
PROT SERPL-MCNC: 6.9 G/DL (ref 6.1–8.1)
SODIUM SERPL-SCNC: 139 MMOL/L (ref 135–146)
TRIGL SERPL-MCNC: 124 MG/DL

## 2025-06-04 ENCOUNTER — APPOINTMENT (OUTPATIENT)
Dept: PRIMARY CARE | Facility: CLINIC | Age: 75
End: 2025-06-04
Payer: MEDICARE

## 2025-06-04 VITALS
OXYGEN SATURATION: 97 % | TEMPERATURE: 97.2 F | HEART RATE: 78 BPM | WEIGHT: 166 LBS | DIASTOLIC BLOOD PRESSURE: 80 MMHG | SYSTOLIC BLOOD PRESSURE: 132 MMHG | BODY MASS INDEX: 29.41 KG/M2

## 2025-06-04 DIAGNOSIS — I10 ESSENTIAL HYPERTENSION: ICD-10-CM

## 2025-06-04 DIAGNOSIS — I25.84 CORONARY ARTERY DISEASE DUE TO CALCIFIED CORONARY LESION: ICD-10-CM

## 2025-06-04 DIAGNOSIS — K21.9 GASTROESOPHAGEAL REFLUX DISEASE WITHOUT ESOPHAGITIS: ICD-10-CM

## 2025-06-04 DIAGNOSIS — I25.10 CORONARY ARTERY DISEASE DUE TO CALCIFIED CORONARY LESION: ICD-10-CM

## 2025-06-04 DIAGNOSIS — E78.5 HYPERLIPIDEMIA, UNSPECIFIED HYPERLIPIDEMIA TYPE: ICD-10-CM

## 2025-06-04 PROCEDURE — G2211 COMPLEX E/M VISIT ADD ON: HCPCS | Performed by: FAMILY MEDICINE

## 2025-06-04 PROCEDURE — 3079F DIAST BP 80-89 MM HG: CPT | Performed by: FAMILY MEDICINE

## 2025-06-04 PROCEDURE — 99214 OFFICE O/P EST MOD 30 MIN: CPT | Performed by: FAMILY MEDICINE

## 2025-06-04 PROCEDURE — 3075F SYST BP GE 130 - 139MM HG: CPT | Performed by: FAMILY MEDICINE

## 2025-06-04 PROCEDURE — 1036F TOBACCO NON-USER: CPT | Performed by: FAMILY MEDICINE

## 2025-06-04 PROCEDURE — 1160F RVW MEDS BY RX/DR IN RCRD: CPT | Performed by: FAMILY MEDICINE

## 2025-06-04 PROCEDURE — 1159F MED LIST DOCD IN RCRD: CPT | Performed by: FAMILY MEDICINE

## 2025-06-04 RX ORDER — LOSARTAN POTASSIUM 100 MG/1
100 TABLET ORAL DAILY
Qty: 100 TABLET | Refills: 1 | Status: SHIPPED | OUTPATIENT
Start: 2025-06-04

## 2025-06-04 RX ORDER — OMEPRAZOLE 10 MG/1
10 CAPSULE, DELAYED RELEASE ORAL EVERY OTHER DAY
Qty: 100 CAPSULE | Refills: 1 | Status: SHIPPED | OUTPATIENT
Start: 2025-06-04

## 2025-06-04 RX ORDER — ATORVASTATIN CALCIUM 40 MG/1
TABLET, FILM COATED ORAL
Qty: 100 TABLET | Refills: 1 | Status: SHIPPED | OUTPATIENT
Start: 2025-06-04

## 2025-06-04 RX ORDER — ATORVASTATIN CALCIUM 40 MG/1
40 TABLET, FILM COATED ORAL DAILY
Qty: 100 TABLET | Refills: 1 | Status: SHIPPED | OUTPATIENT
Start: 2025-06-04

## 2025-06-04 RX ORDER — ASPIRIN 81 MG/1
81 TABLET ORAL DAILY
COMMUNITY

## 2025-06-04 ASSESSMENT — ENCOUNTER SYMPTOMS
HEADACHES: 0
DIARRHEA: 0
ABDOMINAL PAIN: 0
DYSPHORIC MOOD: 0
HYPERTENSION: 1
POLYDIPSIA: 0
BLOOD IN STOOL: 0
MYALGIAS: 0
CONSTIPATION: 0
POLYPHAGIA: 0
FATIGUE: 0
PALPITATIONS: 0
DIFFICULTY URINATING: 0
SHORTNESS OF BREATH: 0
NAUSEA: 0
DYSURIA: 0
VOMITING: 0
DIZZINESS: 0
SLEEP DISTURBANCE: 0

## 2025-06-04 NOTE — PATIENT INSTRUCTIONS
Recommend a predominant low fat whole foods plant based diet.  Cut back on meat, dairy, processed carbs, salt and oils(especially palm and coconut). Increase fiber in your diet.  Decrease alcohol as much as possible if you drink. Recommend regular exercise most days of the week(goal up to 150min per week). Also recommend good sleep habits aiming for 7-8 hours per night.     Ok to try to wean off omeprazole    Continue your current meds    Return in 6 months for recheck and wellness visit, sooner if needed

## 2025-06-04 NOTE — PROGRESS NOTES
Subjective   Patient ID: Bianka Huffman is a 74 y.o. female who presents for Hypertension, Chronic Kidney Disease, Hyperlipidemia, and GERD (Recheck, review labs) and multiple issues    Hypertension  Pertinent negatives include no chest pain, headaches, palpitations or shortness of breath.   Hyperlipidemia  Pertinent negatives include no chest pain, myalgias or shortness of breath.   GERD  She reports no abdominal pain, no chest pain or no nausea. Pertinent negatives include no fatigue.      HTN: controlled.   Lipids: controlled. HDL 74, LDL 94,   GERD: has been controlled. Wants to wean off med since flares only w/ pork   CAD: stable. No CP    Review of Systems   Constitutional:  Negative for fatigue.   Eyes:  Negative for visual disturbance.   Respiratory:  Negative for shortness of breath.    Cardiovascular:  Negative for chest pain and palpitations.   Gastrointestinal:  Negative for abdominal pain, blood in stool, constipation, diarrhea, nausea and vomiting.   Endocrine: Negative for polydipsia, polyphagia and polyuria.   Genitourinary:  Negative for difficulty urinating and dysuria.   Musculoskeletal:  Negative for myalgias.   Skin:  Negative for rash.   Neurological:  Negative for dizziness and headaches.   Psychiatric/Behavioral:  Negative for dysphoric mood and sleep disturbance.        Objective   /80   Pulse 78   Temp 36.2 °C (97.2 °F)   Wt 75.3 kg (166 lb)   SpO2 97%   BMI 29.41 kg/m²     Physical Exam  Vitals and nursing note reviewed.   Constitutional:       General: She is not in acute distress.     Appearance: Normal appearance. She is not toxic-appearing.   HENT:      Head: Normocephalic.      Mouth/Throat:      Pharynx: Oropharynx is clear.   Eyes:      General: No scleral icterus.     Pupils: Pupils are equal, round, and reactive to light.   Neck:      Vascular: No carotid bruit.   Cardiovascular:      Rate and Rhythm: Normal rate and regular rhythm.      Heart sounds: No murmur  heard.  Pulmonary:      Effort: Pulmonary effort is normal. No respiratory distress.      Breath sounds: Normal breath sounds.   Abdominal:      Palpations: Abdomen is soft.      Tenderness: There is no abdominal tenderness. There is no guarding.   Musculoskeletal:         General: No tenderness.   Skin:     General: Skin is warm.   Neurological:      General: No focal deficit present.      Mental Status: She is alert.      Cranial Nerves: No cranial nerve deficit.   Psychiatric:         Mood and Affect: Mood normal.       Assessment/Plan   Problem List Items Addressed This Visit           ICD-10-CM    Essential hypertension I10    Relevant Medications    losartan (Cozaar) 100 mg tablet    losartan (Cozaar) 100 mg tablet    Other Relevant Orders    Comprehensive Metabolic Panel    Gastroesophageal reflux disease K21.9    Relevant Medications    omeprazole (PriLOSEC) 10 mg DR capsule    Hyperlipidemia E78.5    Relevant Medications    atorvastatin (Lipitor) 40 mg tablet    atorvastatin (Lipitor) 40 mg tablet    Other Relevant Orders    Comprehensive Metabolic Panel     Other Visit Diagnoses         Codes      Coronary artery disease due to calcified coronary lesion     I25.10, I25.84    Relevant Medications    atorvastatin (Lipitor) 40 mg tablet        Discussed bw including CMP, lipids. Stable on meds but ok to wean off PPI. Recommendations given

## 2025-06-24 ENCOUNTER — APPOINTMENT (OUTPATIENT)
Dept: PRIMARY CARE | Facility: CLINIC | Age: 75
End: 2025-06-24
Payer: MEDICARE

## 2025-06-24 VITALS
TEMPERATURE: 97.6 F | SYSTOLIC BLOOD PRESSURE: 142 MMHG | WEIGHT: 166 LBS | HEART RATE: 75 BPM | OXYGEN SATURATION: 98 % | DIASTOLIC BLOOD PRESSURE: 88 MMHG | BODY MASS INDEX: 29.41 KG/M2

## 2025-06-24 DIAGNOSIS — I65.29 ATHEROSCLEROSIS OF COMMON CAROTID ARTERY: Primary | ICD-10-CM

## 2025-06-24 DIAGNOSIS — I10 PRIMARY HYPERTENSION: ICD-10-CM

## 2025-06-24 DIAGNOSIS — R42 VERTIGO: ICD-10-CM

## 2025-06-24 PROCEDURE — 1036F TOBACCO NON-USER: CPT | Performed by: FAMILY MEDICINE

## 2025-06-24 PROCEDURE — G2211 COMPLEX E/M VISIT ADD ON: HCPCS | Performed by: FAMILY MEDICINE

## 2025-06-24 PROCEDURE — 3079F DIAST BP 80-89 MM HG: CPT | Performed by: FAMILY MEDICINE

## 2025-06-24 PROCEDURE — 1159F MED LIST DOCD IN RCRD: CPT | Performed by: FAMILY MEDICINE

## 2025-06-24 PROCEDURE — 1160F RVW MEDS BY RX/DR IN RCRD: CPT | Performed by: FAMILY MEDICINE

## 2025-06-24 PROCEDURE — 99214 OFFICE O/P EST MOD 30 MIN: CPT | Performed by: FAMILY MEDICINE

## 2025-06-24 PROCEDURE — 3077F SYST BP >= 140 MM HG: CPT | Performed by: FAMILY MEDICINE

## 2025-06-24 RX ORDER — MECLIZINE HYDROCHLORIDE 25 MG/1
25 TABLET ORAL 3 TIMES DAILY
COMMUNITY
Start: 2025-06-18 | End: 2025-07-18

## 2025-06-24 ASSESSMENT — ENCOUNTER SYMPTOMS
DIARRHEA: 0
NAUSEA: 0
ARTHRALGIAS: 0
BLOOD IN STOOL: 0
VOMITING: 0
SLEEP DISTURBANCE: 0
SHORTNESS OF BREATH: 0
HEADACHES: 0
POLYDIPSIA: 0
DYSPHORIC MOOD: 0
FATIGUE: 0
PALPITATIONS: 0
MYALGIAS: 0
DIFFICULTY URINATING: 0
DIZZINESS: 0
DYSURIA: 0
POLYPHAGIA: 0
ABDOMINAL PAIN: 0

## 2025-06-24 NOTE — PATIENT INSTRUCTIONS
You were referred for US neck    Go to the ER if any of your symptoms are significantly worsening.     Keep track of your home blood pressures.  Log your blood pressure for 10 days. Goal is <130/80.  Notify the office if your blood pressure is consistently high for possible medication adjustment.     Return as scheduled, sooner if needed

## 2025-06-24 NOTE — PROGRESS NOTES
Subjective   Patient ID: Bianka Huffman is a 74 y.o. female who presents for Hospital Follow-up ([facility] 6/17-6/18 Re: dizziness, vomiting).    HPI   Vertigo: had some vertigo after ready for about 4 hours and when she went to go to the bathroom, she felt woozy. Water did not seem to help. Went to bed and next morning symptoms worsened. Had N/V. EMS called. Pt taken to ER. CT neg so pt admitted for stroke protocol. MRI showed no acute findings. Pt discharged on meclizine.     Feeling better. Some fatigue w/ meclizine. Taking TID. No recurrence. No tinnitus or recent illness.     HTN: borderline today.     Carotid athero: seen on CT head. On statin.     Review of Systems   Constitutional:  Negative for fatigue.   HENT: Negative.  Negative for congestion.    Eyes:  Negative for visual disturbance.   Respiratory:  Negative for shortness of breath.    Cardiovascular:  Negative for chest pain and palpitations.   Gastrointestinal:  Negative for abdominal pain, blood in stool, diarrhea, nausea and vomiting.   Endocrine: Negative for cold intolerance, heat intolerance, polydipsia, polyphagia and polyuria.   Genitourinary:  Negative for difficulty urinating and dysuria.   Musculoskeletal:  Negative for arthralgias and myalgias.   Neurological:  Negative for dizziness and headaches.   Psychiatric/Behavioral:  Negative for dysphoric mood and sleep disturbance.        Objective   /88   Pulse 75   Temp 36.4 °C (97.6 °F)   Wt 75.3 kg (166 lb)   SpO2 98%   BMI 29.41 kg/m²     Physical Exam  Vitals and nursing note reviewed.   Constitutional:       General: She is not in acute distress.     Appearance: Normal appearance. She is not toxic-appearing.   HENT:      Head: Normocephalic.      Right Ear: Tympanic membrane normal.      Left Ear: Tympanic membrane normal.      Mouth/Throat:      Pharynx: Oropharynx is clear.   Eyes:      Pupils: Pupils are equal, round, and reactive to light.   Neck:      Vascular: No carotid  bruit.   Cardiovascular:      Rate and Rhythm: Normal rate and regular rhythm.      Heart sounds: No murmur heard.  Pulmonary:      Effort: Pulmonary effort is normal. No respiratory distress.      Breath sounds: Normal breath sounds.   Abdominal:      Tenderness: There is no abdominal tenderness. There is no guarding.   Musculoskeletal:         General: No tenderness.      Cervical back: Neck supple.      Right lower leg: No edema.      Left lower leg: No edema.   Lymphadenopathy:      Cervical: No cervical adenopathy.   Skin:     General: Skin is warm.   Neurological:      General: No focal deficit present.      Mental Status: She is alert.      Cranial Nerves: No cranial nerve deficit.   Psychiatric:         Mood and Affect: Mood normal.         Assessment/Plan   Problem List Items Addressed This Visit    None  Visit Diagnoses         Codes      Atherosclerosis of common carotid artery    -  Primary I65.29    Relevant Orders    Vascular US Carotid Artery Duplex Bilateral      Vertigo     R42    Relevant Orders    Vascular US Carotid Artery Duplex Bilateral      Primary hypertension     I10        Reviewed ER/hospital reports including CT and MRI head. Recommendations given. Discussed vestibular therapy. If unable to complete at home, pt to call for PT referral

## 2025-07-24 ENCOUNTER — HOSPITAL ENCOUNTER (OUTPATIENT)
Dept: VASCULAR MEDICINE | Facility: HOSPITAL | Age: 75
Discharge: HOME | End: 2025-07-24
Payer: MEDICARE

## 2025-07-24 DIAGNOSIS — R42 VERTIGO: ICD-10-CM

## 2025-07-24 DIAGNOSIS — R09.89 OTHER SPECIFIED SYMPTOMS AND SIGNS INVOLVING THE CIRCULATORY AND RESPIRATORY SYSTEMS: ICD-10-CM

## 2025-07-24 DIAGNOSIS — I65.29 ATHEROSCLEROSIS OF COMMON CAROTID ARTERY: ICD-10-CM

## 2025-07-24 PROCEDURE — 93880 EXTRACRANIAL BILAT STUDY: CPT

## 2025-07-24 PROCEDURE — 93880 EXTRACRANIAL BILAT STUDY: CPT | Performed by: INTERNAL MEDICINE

## 2025-07-25 ENCOUNTER — RESULTS FOLLOW-UP (OUTPATIENT)
Dept: PRIMARY CARE | Facility: CLINIC | Age: 75
End: 2025-07-25
Payer: MEDICARE

## 2025-07-25 NOTE — TELEPHONE ENCOUNTER
----- Message from Saba Daniels sent at 7/25/2025  1:34 PM EDT -----  Pls tell pt that she does have cholesterol plaques in the neck arteries but no sig blockage. Important to cont to control BP and cholesterol  ----- Message -----  From: Interface, [facility] - Cardiology Results In  Sent: 7/25/2025  10:50 AM EDT  To: Saba Daniels, DO

## 2025-12-08 ENCOUNTER — APPOINTMENT (OUTPATIENT)
Dept: PRIMARY CARE | Facility: CLINIC | Age: 75
End: 2025-12-08
Payer: MEDICARE